# Patient Record
Sex: FEMALE | Race: WHITE
[De-identification: names, ages, dates, MRNs, and addresses within clinical notes are randomized per-mention and may not be internally consistent; named-entity substitution may affect disease eponyms.]

---

## 2018-01-09 ENCOUNTER — HOSPITAL ENCOUNTER (INPATIENT)
Dept: HOSPITAL 36 - ER | Age: 83
LOS: 6 days | Discharge: HOME | DRG: 885 | End: 2018-01-15
Attending: PSYCHIATRY & NEUROLOGY | Admitting: PSYCHIATRY & NEUROLOGY
Payer: MEDICARE

## 2018-01-09 DIAGNOSIS — Z90.11: ICD-10-CM

## 2018-01-09 DIAGNOSIS — D50.9: ICD-10-CM

## 2018-01-09 DIAGNOSIS — J44.9: ICD-10-CM

## 2018-01-09 DIAGNOSIS — M81.0: ICD-10-CM

## 2018-01-09 DIAGNOSIS — F31.9: Primary | ICD-10-CM

## 2018-01-09 DIAGNOSIS — M19.90: ICD-10-CM

## 2018-01-09 DIAGNOSIS — M54.9: ICD-10-CM

## 2018-01-09 DIAGNOSIS — I10: ICD-10-CM

## 2018-01-09 DIAGNOSIS — R45.851: ICD-10-CM

## 2018-01-09 DIAGNOSIS — Z79.51: ICD-10-CM

## 2018-01-09 DIAGNOSIS — F03.90: ICD-10-CM

## 2018-01-09 LAB
ALBUMIN SERPL-MCNC: 3.3 GM/DL (ref 3.7–5.3)
ALBUMIN/GLOB SERPL: 1.3 {RATIO} (ref 1–1.8)
ALP SERPL-CCNC: 70 U/L (ref 34–104)
ALT SERPL-CCNC: 12 U/L (ref 7–52)
ANION GAP SERPL CALC-SCNC: 5.1 MMOL/L (ref 7–16)
APPEARANCE UR: CLEAR
AST SERPL-CCNC: 12 U/L (ref 13–39)
BACTERIA #/AREA URNS HPF: (no result) /HPF
BASOPHILS # BLD AUTO: 0 TH/CUMM (ref 0–0.2)
BASOPHILS NFR BLD AUTO: 0.3 % (ref 0–2)
BILIRUB SERPL-MCNC: 0.2 MG/DL (ref 0.3–1)
BILIRUB UR-MCNC: NEGATIVE MG/DL
BUN SERPL-MCNC: 27 MG/DL (ref 7–25)
CALCIUM SERPL-MCNC: 8.4 MG/DL (ref 8.6–10.3)
CHLORIDE SERPL-SCNC: 109 MEQ/L (ref 98–107)
CO2 SERPL-SCNC: 30.8 MEQ/L (ref 21–31)
COLOR UR: YELLOW
CREAT SERPL-MCNC: 0.7 MG/DL (ref 0.6–1.2)
EOSINOPHIL # BLD AUTO: 0.1 TH/CMM (ref 0.1–0.4)
EOSINOPHIL NFR BLD AUTO: 0.9 % (ref 0–5)
EPI CELLS URNS QL MICRO: (no result) /LPF
ERYTHROCYTE [DISTWIDTH] IN BLOOD BY AUTOMATED COUNT: 16.4 % (ref 11.5–20)
GLOBULIN SER-MCNC: 2.5 GM/DL
GLUCOSE SERPL-MCNC: 92 MG/DL (ref 70–105)
GLUCOSE UR STRIP-MCNC: NEGATIVE MG/DL
HCT VFR BLD CALC: 35.2 % (ref 41–60)
HGB BLD-MCNC: 11.1 GM/DL (ref 12–16)
KETONES UR STRIP-MCNC: NEGATIVE MG/DL
LEUKOCYTE ESTERASE UR-ACNC: NEGATIVE
LYMPHOCYTE AB SER FC-ACNC: 1.3 TH/CMM (ref 1.5–3)
LYMPHOCYTES NFR BLD AUTO: 16.8 % (ref 20–50)
MCH RBC QN AUTO: 24.1 PG (ref 27–31)
MCHC RBC AUTO-ENTMCNC: 31.6 PG (ref 28–36)
MCV RBC AUTO: 76.3 FL (ref 81–100)
MICRO URNS: YES
MONOCYTES # BLD AUTO: 0.7 TH/CMM (ref 0.3–1)
MONOCYTES NFR BLD AUTO: 9.1 % (ref 2–10)
NEUTROPHILS # BLD: 5.7 TH/CMM (ref 1.8–8)
NEUTROPHILS NFR BLD AUTO: 72.9 % (ref 40–80)
NITRITE UR QL STRIP: NEGATIVE
PH UR STRIP: 5.5 [PH] (ref 4.6–8)
PLATELET # BLD: 397 TH/CMM (ref 150–400)
PMV BLD AUTO: 8.2 FL
POTASSIUM SERPL-SCNC: 3.9 MEQ/L (ref 3.5–5.1)
PROT UR STRIP-MCNC: NEGATIVE MG/DL
RBC # BLD AUTO: 4.61 MIL/CMM (ref 3.8–5.2)
RBC # UR STRIP: NEGATIVE /UL
RBC #/AREA URNS HPF: (no result) /HPF (ref 0–5)
SODIUM SERPL-SCNC: 141 MEQ/L (ref 136–145)
SP GR UR STRIP: >= 1.03 (ref 1–1.03)
URINALYSIS COMPLETE PNL UR: (no result)
UROBILINOGEN UR STRIP-ACNC: 0.2 E.U./DL (ref 0.2–1)
WBC # BLD AUTO: 7.8 TH/CMM (ref 4.8–10.8)
WBC #/AREA URNS HPF: (no result) /HPF (ref 0–5)

## 2018-01-09 PROCEDURE — Z7610: HCPCS

## 2018-01-09 NOTE — ED PHYSICIAN CHART
ED Chief Complaint/HPI





- Patient Information


Date Seen:: 18


Time Seen:: 19:45


Chief Complaint:: Suicidal ideation


History of Present Illness:: 


81 yo female was brought from SNF to ER for evaluation of suicidal ideation, 

talking about running into traffic.





ED Past Medical History





- Past Medical History


Past Medical History: HTN, Asthma/COPD, Other (back pain)


Surgical History: other (right partial mastectomy)


Psychiatricy History: Bipolar, Other (anxiety)





Family Medical History





- Family Member


  ** Mother


History Unknown: Yes


Ethnicity: Non-


Living Status: 





ED Assessment





- Assessment


Assessment/Comments:: 


Patient is cleared for geropsych admission.

## 2018-01-10 VITALS — DIASTOLIC BLOOD PRESSURE: 89 MMHG | SYSTOLIC BLOOD PRESSURE: 140 MMHG

## 2018-01-10 RX ADMIN — IPRATROPIUM BROMIDE AND ALBUTEROL SULFATE SCH: .5; 3 SOLUTION RESPIRATORY (INHALATION) at 23:50

## 2018-01-10 RX ADMIN — OYSTER SHELL CALCIUM WITH VITAMIN D SCH TAB: 500; 200 TABLET, FILM COATED ORAL at 08:24

## 2018-01-10 RX ADMIN — ALUMINUM HYDROXIDE, MAGNESIUM HYDROXIDE, AND SIMETHICONE PRN ML: 200; 200; 20 SUSPENSION ORAL at 10:57

## 2018-01-10 RX ADMIN — IPRATROPIUM BROMIDE AND ALBUTEROL SULFATE SCH ML: .5; 3 SOLUTION RESPIRATORY (INHALATION) at 16:00

## 2018-01-10 RX ADMIN — IPRATROPIUM BROMIDE AND ALBUTEROL SULFATE SCH ML: .5; 3 SOLUTION RESPIRATORY (INHALATION) at 08:14

## 2018-01-10 NOTE — HISTORY AND PHYSICAL
History of Present Illness





- HPI


Vital Signs: 





 Last Vital Signs











Temp  98.7 F   01/10/18 07:01


 


Pulse  92   01/10/18 08:14


 


Resp  18   01/10/18 08:14


 


BP  144/81   01/10/18 07:01


 


Pulse Ox  93   01/10/18 08:14














Family Medical History





- Family Member


  ** Mother


History Unknown: Yes


Ethnicity: Unknown


Living Status: 


Hx Family Cancer: Yes


Hx Family Coronary Artery Disease:  (UNKNOWN)


Hx Family Congestive Heart Failure:  (UNKNOWN)


Hx Family Hypertension:  (UNKNOWN)


Hx Family Stroke:  (UNKNOWN)


Hx Family Diabetes:  (UNKNOWN)


Hx Family Seizures:  (UNKNOWN)


Hx Family Dementia:  (UNKNOWN)


Hx Family AIDS:  (UNKNOWN)


Hx Family HIV: No


Hx Family COPD: Yes (UNKNOWN)


Hx Family Hepatitis:  (UNKNOWN)


Hx Family Psychiatric Problems:  (unknown)


Hx Family Tuberculosis:  (UNKNOWN)





- Medications


Home Medications: 


Home Medication











 Medication  Instructions  Recorded  Type


 


Acetaminophen [Tylenol] 650 mg PO Q6HR PRN 18 History


 


Albuterol/Ipratropium Neb [Duoneb 3 ml HHN Q8HR 18 History





Neb]   


 


Alendronate Sodium 10 mg PO DAILY 18 History


 


Alprazolam [Alprazolam*] 0.25 mg PO Q8HR PRN 18 History


 


Calcium Carb/Vit D 500mg/200U 1 tab PO DAILY 18 History





[Oscal w/Vitamin D]   


 


Donepezil Hcl [Aricept] 5 mg PO HS 18 History


 


Ferrous Sulfate [Iron] 325 mg PO BID 18 History


 


Fluticasone/Vilanterol [Breo 1 each IH DAILY 18 History





Ellipta 200-25 Mcg INH]   


 


Hydrocodone/APAP 5mg/325mg [Norco 1 tab PO BID PRN 18 History





5mg/325mg]   


 


QUEtiapine Fumarate [SEROquel] 50 mg PO HS 18 History


 


Ranitidine HCl 150 mg PO BID 18 History














- Allergies


Allergies/Adverse Reactions: 


 Allergies











Allergy/AdvReac Type Severity Reaction Status Date / Time


 


No Known Allergies Allergy   Verified 18 19:49

## 2018-01-10 NOTE — DIAGNOSTIC IMAGING REPORT
CHEST X-RAY: AP view



INDICATION: Shortness of breath



COMPARISON: None



FINDINGS: Chronic lung changes are seen. Postsurgical changes along the

right lower hemithorax is noted possibly the right breast. Linear

densities of the lower lung zones are noted. No evidence of gross free

air. Heart size normal. Tortuous aorta is noted with atherosclerotic

vascular disease. Degenerative changes of the spine are noted.



IMPRESSION:



Chronic lung changes of probable COPD. No focal consolidation

identified.



Linear bibasal densities which may represent atelectasis versus

scarring.



Postsurgical changes of right lower hemithorax which may be within the

right breast, please correlate with clinical and surgical history.



Tortuous aorta with atherosclerosis.

## 2018-01-11 RX ADMIN — OYSTER SHELL CALCIUM WITH VITAMIN D SCH TAB: 500; 200 TABLET, FILM COATED ORAL at 08:06

## 2018-01-11 RX ADMIN — ALUMINUM HYDROXIDE, MAGNESIUM HYDROXIDE, AND SIMETHICONE PRN ML: 200; 200; 20 SUSPENSION ORAL at 11:17

## 2018-01-11 RX ADMIN — IPRATROPIUM BROMIDE AND ALBUTEROL SULFATE SCH ML: .5; 3 SOLUTION RESPIRATORY (INHALATION) at 07:21

## 2018-01-11 RX ADMIN — ALUMINUM HYDROXIDE, MAGNESIUM HYDROXIDE, AND SIMETHICONE PRN ML: 200; 200; 20 SUSPENSION ORAL at 06:55

## 2018-01-11 RX ADMIN — IPRATROPIUM BROMIDE AND ALBUTEROL SULFATE SCH ML: .5; 3 SOLUTION RESPIRATORY (INHALATION) at 15:13

## 2018-01-11 RX ADMIN — IPRATROPIUM BROMIDE AND ALBUTEROL SULFATE SCH ML: .5; 3 SOLUTION RESPIRATORY (INHALATION) at 23:19

## 2018-01-11 NOTE — PROGRESS NOTES
DATE:  01/11/2018



Covering for Dr. Simmons.



Case was discussed with staff of the patient, reviewed records.  This is an

82-year-old female who was admitted on 01/09/2018, sent from skilled nursing

facility.  She was suicidal, endorsing intent and plan to run into traffic.  She

was feeling depressed.  No family support, no social supports and unable to

sleep.  Appetite is fair and feeling hopeless and helpless and not wanting to

live anymore, endorsing psychological distress in terms.  She has a history of

bipolar affective disorder, has been on Abilify, Seroquel and Paxil.  The

patient when I talked to her, she was internally preoccupied.  She is still

depressed, suicidal, but she did not disclose any plan for me.  She ignored me. 

She was restarted on her medication by Dr. Simmons yesterday.  She is on

alprazolam 0.25 mg every 8 hours and Aricept 5 mg at bedtime.  She is demented

and confused and Seroquel 50 mg at bedtime.  She is unpredictable, impulsive,

needing redirection, so it is too early to make any adjustments to her

medication.  We will continue to work with the patient in group therapy, milieu

therapy, adjust the medication as needed.





DD: 01/11/2018 12:34

DT: 01/11/2018 14:38

JOB# [id number]  [id number]

## 2018-01-11 NOTE — PSYCHOSOCIAL EVALUATION
DATE OF SERVICE:  01/10/2018



JUSTIFICATION FOR HOSPITALIZATION:  Sent over from a skilled nursing facility,

suicidal, apparently endorsing intent and plan to run into traffic.



CHIEF COMPLAINT:  "I am very depressed."



HISTORY OF PRESENT ILLNESS:  An 82-year-old female, severely depressed,

hopeless, despairing, no family support, no social support, difficulty sleeping

at night.  Fair appetite hopeless and helpless thoughts, not wanting to live

anymore, endorsing psychological distress, in turmoil.



PAST PSYCHIATRIC HISTORY:  Apparently with bipolar affective disorder, had been

on Abilify, Seroquel, and Paxil.



FAMILY HISTORY:  Noncontributory.



SOCIAL HISTORY:  Some family involvement from son and daughter.  The patient

states that she is lonely, misses her family, living in a skilled nursing

facility.



MEDICATIONS:  Noted.



PAST MEDICAL HISTORY:  Reviewed.



MENTAL STATUS EXAMINATION:  Stated age.  Fair eye contact, hard of hearing. 

Mood "Upset."  Affect constricted.  Thought processes were somewhat

disoriented, but awake and alert and engaged.  The patient endorsing SI with

thoughts to run into traffic.  No HI.  No overt psychotic symptoms.  Insight and

judgment diminished.  Poor coping, poor impulse control.



PROVISIONAL DIAGNOSES:  Bipolar, most recent episode depressed, also with

history of dementia.  Under medical, please see full H and P.



ESTIMATED LENGTH OF STAY:  5-6 days.



ASSESSMENT:  The patient requiring inpatient hospitalization, severely

depressed, hopeless, suicidal, endorsing intent and plan.



PLAN:  Restart Seroquel.  The patient historically seems to have been doing

better on Seroquel.  We will increase collateral.



TREATMENT PLAN:  Includes group as well as milieu therapy.



CONDITIONS FOR DISCHARGE:  Improved mood, improved affect, cessation of any SI,

better coping.





DD: 01/10/2018 09:39

DT: 01/11/2018 07:38

JOB# [id number]  [id number]

## 2018-01-11 NOTE — GENERAL PROGRESS NOTE
Subjective





- Review of Systems


Events since last encounter: 





confused


in no acute distress 





Objective





- Results


Result Diagrams: 


 01/09/18 19:59





 01/09/18 19:59


Recent Labs: 


 Laboratory Last Values











WBC  7.8 Th/cmm (4.8-10.8)   01/09/18  19:59    


 


RBC  4.61 Mil/cmm (3.80-5.20)   01/09/18  19:59    


 


Hgb  11.1 gm/dL (12-16)  L  01/09/18  19:59    


 


Hct  35.2 % (41.0-60)  L  01/09/18  19:59    


 


MCV  76.3 fl ()  L  01/09/18  19:59    


 


MCH  24.1 pg (27.0-31.0)  L  01/09/18  19:59    


 


MCHC Differential  31.6 pg (28.0-36.0)   01/09/18  19:59    


 


RDW  16.4 % (11.5-20.0)   01/09/18  19:59    


 


Plt Count  397 Th/cmm (150-400)   01/09/18  19:59    


 


MPV  8.2 fl  01/09/18  19:59    


 


Neutrophils %  72.9 % (40.0-80.0)   01/09/18  19:59    


 


Lymphocytes %  16.8 % (20.0-50.0)  L  01/09/18  19:59    


 


Monocytes %  9.1 % (2.0-10.0)   01/09/18  19:59    


 


Eosinophils %  0.9 % (0.0-5.0)   01/09/18  19:59    


 


Basophils %  0.3 % (0.0-2.0)   01/09/18  19:59    


 


Sodium  141 mEq/L (136-145)   01/09/18  19:59    


 


Potassium  3.9 mEq/L (3.5-5.1)   01/09/18  19:59    


 


Chloride  109 mEq/L ()  H  01/09/18  19:59    


 


Carbon Dioxide  30.8 mEq/L (21.0-31.0)   01/09/18  19:59    


 


Anion Gap  5.1  (7.0-16.0)  L  01/09/18  19:59    


 


BUN  27 mg/dL (7-25)  H  01/09/18  19:59    


 


Creatinine  0.7 mg/dL (0.6-1.2)   01/09/18  19:59    


 


Est GFR ( Amer)  TNP   01/09/18  19:59    


 


Est GFR (Non-Af Amer)  TNP   01/09/18  19:59    


 


BUN/Creatinine Ratio  38.6   01/09/18  19:59    


 


Glucose  92 mg/dL ()   01/09/18  19:59    


 


Calcium  8.4 mg/dL (8.6-10.3)  L  01/09/18  19:59    


 


Total Bilirubin  0.2 mg/dL (0.3-1.0)  L  01/09/18  19:59    


 


AST  12 U/L (13-39)  L  01/09/18  19:59    


 


ALT  12 U/L (7-52)   01/09/18  19:59    


 


Alkaline Phosphatase  70 U/L ()   01/09/18  19:59    


 


Total Protein  5.8 gm/dL (6.0-8.3)  L  01/09/18  19:59    


 


Albumin  3.3 gm/dL (3.7-5.3)  L  01/09/18  19:59    


 


Globulin  2.5 gm/dL  01/09/18  19:59    


 


Albumin/Globulin Ratio  1.3  (1.0-1.8)   01/09/18  19:59    


 


TSH  1.17 uIU/ml (0.34-5.60)   01/09/18  19:59    


 


Urine Source  RANDOM   01/09/18  21:30    


 


Urine Color  YELLOW   01/09/18  21:30    


 


Urine Clarity  CLEAR  (CLEAR)   01/09/18  21:30    


 


Urine pH  5.5  (4.6 - 8.0)   01/09/18  21:30    


 


Ur Specific Gravity  >= 1.030  (1.005-1.030)   01/09/18  21:30    


 


Urine Protein  NEGATIVE mg/dL (NEGATIVE)   01/09/18  21:30    


 


Urine Glucose (UA)  NEGATIVE mg/dL (NEGATIVE)   01/09/18  21:30    


 


Urine Ketones  NEGATIVE mg/dL (NEGATIVE)   01/09/18  21:30    


 


Urine Blood  NEGATIVE  (NEGATIVE)   01/09/18  21:30    


 


Urine Nitrate  NEGATIVE  (NEGATIVE)   01/09/18  21:30    


 


Urine Bilirubin  NEGATIVE  (NEGATIVE)   01/09/18  21:30    


 


Urine Urobilinogen  0.2 E.U./dL (0.2 - 1.0)   01/09/18  21:30    


 


Ur Leukocyte Esterase  NEGATIVE  (NEGATIVE)   01/09/18  21:30    


 


Urine RBC  0-2 /hpf (0-5)   01/09/18  21:30    


 


Urine WBC  2-5 /hpf (0-5)   01/09/18  21:30    


 


Ur Epithelial Cells  OCCASIONAL /lpf (FEW)   01/09/18  21:30    


 


Urine Bacteria  OCCASIONAL /hpf (NONE SEEN)   01/09/18  21:30    














- Physical Exam


Vitals and I&O: 


 Vital Signs











Temp  98.6 F   01/10/18 17:48


 


Pulse  90   01/11/18 07:22


 


Resp  18   01/11/18 07:22


 


BP  132/73   01/10/18 17:48


 


Pulse Ox  95   01/11/18 07:22








 Intake & Output











 01/10/18 01/11/18 01/11/18





 18:59 06:59 18:59


 


Intake Total 120  


 


Balance 120  


 


Intake:   


 


  Oral 120  


 


Other:   


 


  # Voids 3  











Active Medications: 


Current Medications





Acetaminophen (Tylenol)  650 mg PO Q6HR PRN


   PRN Reason: Mild Pain or Fever >101


   Stop: 03/11/18 00:07


Acetaminophen/Hydrocodone Bitart (Norco 5mg/325mg)  1 tab PO BID PRN


   PRN Reason: Pain (Moderate)


   Stop: 03/11/18 00:07


Al Hydrox/Mg Hydrox/Simethicone (Maalox)  30 ml PO Q4HR PRN


   PRN Reason: GI DISTRESS


   Stop: 03/11/18 00:00


   Last Admin: 01/11/18 06:55 Dose:  30 ml


Albuterol/Ipratropium (Duoneb Neb)  3 ml HHN Q8HRT [facility]


   Stop: 03/11/18 06:59


   Last Admin: 01/11/18 07:21 Dose:  3 ml


Alprazolam (Xanax)  0.25 mg PO Q8HR PRN; Protocol


   PRN Reason: Anxiety


   Stop: 03/11/18 00:07


Calcium/Vitamin D (Oscal W/Vitamin D)  1 tab PO DAILY [facility]


   Stop: 03/11/18 08:59


   Last Admin: 01/11/18 08:06 Dose:  1 tab


Donepezil HCl (Aricept)  5 mg PO HS [facility]


   Stop: 03/11/18 20:59


   Last Admin: 01/10/18 20:24 Dose:  5 mg


Ferrous Sulfate (Iron)  325 mg PO BID [facility]


   Stop: 03/11/18 08:59


   Last Admin: 01/11/18 08:06 Dose:  325 mg


Lorazepam (Ativan)  0.5 mg PO Q4HR PRN; Protocol


   PRN Reason: Anxiety


   Stop: 02/09/18 00:00


   Last Admin: 01/10/18 12:51 Dose:  0.5 mg


Magnesium Hydroxide (Milk Of Magnesia)  30 ml PO HS PRN


   PRN Reason: Constipation


Miscellaneous (Alendronate Sodium [Alendronate Sodium])  10 mg PO DAILY [facility]


   Stop: 03/11/18 08:59


Miscellaneous (Fluticasone/Vilanterol [Breo Ellipta 200-25 Mcg Inh])  1 each IH 

DAILY [facility]


   Stop: 03/11/18 08:59


Multivitamins/Vitamin C (Theragran)  1 tab PO DAILY [facility]


   Stop: 03/11/18 08:59


   Last Admin: 01/11/18 08:06 Dose:  1 tab


Quetiapine Fumarate (Seroquel)  50 mg PO HS [facility]


   PRN Reason: Protocol


   Stop: 03/11/18 20:59


   Last Admin: 01/10/18 20:24 Dose:  50 mg


Zolpidem Tartrate (Ambien)  5 mg PO HS PRN


   PRN Reason: Insomnia


   Stop: 03/11/18 00:00

## 2018-01-12 RX ADMIN — IPRATROPIUM BROMIDE AND ALBUTEROL SULFATE SCH ML: .5; 3 SOLUTION RESPIRATORY (INHALATION) at 08:37

## 2018-01-12 RX ADMIN — IPRATROPIUM BROMIDE AND ALBUTEROL SULFATE SCH ML: .5; 3 SOLUTION RESPIRATORY (INHALATION) at 16:17

## 2018-01-12 RX ADMIN — ALUMINUM HYDROXIDE, MAGNESIUM HYDROXIDE, AND SIMETHICONE PRN ML: 200; 200; 20 SUSPENSION ORAL at 02:03

## 2018-01-12 RX ADMIN — ALUMINUM HYDROXIDE, MAGNESIUM HYDROXIDE, AND SIMETHICONE PRN ML: 200; 200; 20 SUSPENSION ORAL at 17:49

## 2018-01-12 RX ADMIN — OYSTER SHELL CALCIUM WITH VITAMIN D SCH TAB: 500; 200 TABLET, FILM COATED ORAL at 08:54

## 2018-01-12 NOTE — INTERNAL MEDICINE PROG NOTE
Internal Medicine Subjective





- Subjective


Service Date: 01/12/18


Patient seen and examined:: with staff


Patient is:: awake


Per staff patient has:: no adverse event





Internal Medicine Objective





- Results


Result Diagrams: 


 01/09/18 19:59





 01/09/18 19:59


Recent Labs: 


 Laboratory Last Values











WBC  7.8 Th/cmm (4.8-10.8)   01/09/18  19:59    


 


RBC  4.61 Mil/cmm (3.80-5.20)   01/09/18  19:59    


 


Hgb  11.1 gm/dL (12-16)  L  01/09/18  19:59    


 


Hct  35.2 % (41.0-60)  L  01/09/18  19:59    


 


MCV  76.3 fl ()  L  01/09/18  19:59    


 


MCH  24.1 pg (27.0-31.0)  L  01/09/18  19:59    


 


MCHC Differential  31.6 pg (28.0-36.0)   01/09/18  19:59    


 


RDW  16.4 % (11.5-20.0)   01/09/18  19:59    


 


Plt Count  397 Th/cmm (150-400)   01/09/18  19:59    


 


MPV  8.2 fl  01/09/18  19:59    


 


Neutrophils %  72.9 % (40.0-80.0)   01/09/18  19:59    


 


Lymphocytes %  16.8 % (20.0-50.0)  L  01/09/18  19:59    


 


Monocytes %  9.1 % (2.0-10.0)   01/09/18  19:59    


 


Eosinophils %  0.9 % (0.0-5.0)   01/09/18  19:59    


 


Basophils %  0.3 % (0.0-2.0)   01/09/18  19:59    


 


Sodium  141 mEq/L (136-145)   01/09/18  19:59    


 


Potassium  3.9 mEq/L (3.5-5.1)   01/09/18  19:59    


 


Chloride  109 mEq/L ()  H  01/09/18  19:59    


 


Carbon Dioxide  30.8 mEq/L (21.0-31.0)   01/09/18  19:59    


 


Anion Gap  5.1  (7.0-16.0)  L  01/09/18  19:59    


 


BUN  27 mg/dL (7-25)  H  01/09/18  19:59    


 


Creatinine  0.7 mg/dL (0.6-1.2)   01/09/18  19:59    


 


Est GFR ( Amer)  TNP   01/09/18  19:59    


 


Est GFR (Non-Af Amer)  TNP   01/09/18  19:59    


 


BUN/Creatinine Ratio  38.6   01/09/18  19:59    


 


Glucose  92 mg/dL ()   01/09/18  19:59    


 


Calcium  8.4 mg/dL (8.6-10.3)  L  01/09/18  19:59    


 


Total Bilirubin  0.2 mg/dL (0.3-1.0)  L  01/09/18  19:59    


 


AST  12 U/L (13-39)  L  01/09/18  19:59    


 


ALT  12 U/L (7-52)   01/09/18  19:59    


 


Alkaline Phosphatase  70 U/L ()   01/09/18  19:59    


 


Total Protein  5.8 gm/dL (6.0-8.3)  L  01/09/18  19:59    


 


Albumin  3.3 gm/dL (3.7-5.3)  L  01/09/18  19:59    


 


Globulin  2.5 gm/dL  01/09/18  19:59    


 


Albumin/Globulin Ratio  1.3  (1.0-1.8)   01/09/18  19:59    


 


TSH  1.17 uIU/ml (0.34-5.60)   01/09/18  19:59    


 


Urine Source  RANDOM   01/09/18  21:30    


 


Urine Color  YELLOW   01/09/18  21:30    


 


Urine Clarity  CLEAR  (CLEAR)   01/09/18  21:30    


 


Urine pH  5.5  (4.6 - 8.0)   01/09/18  21:30    


 


Ur Specific Gravity  >= 1.030  (1.005-1.030)   01/09/18  21:30    


 


Urine Protein  NEGATIVE mg/dL (NEGATIVE)   01/09/18  21:30    


 


Urine Glucose (UA)  NEGATIVE mg/dL (NEGATIVE)   01/09/18  21:30    


 


Urine Ketones  NEGATIVE mg/dL (NEGATIVE)   01/09/18  21:30    


 


Urine Blood  NEGATIVE  (NEGATIVE)   01/09/18  21:30    


 


Urine Nitrate  NEGATIVE  (NEGATIVE)   01/09/18  21:30    


 


Urine Bilirubin  NEGATIVE  (NEGATIVE)   01/09/18  21:30    


 


Urine Urobilinogen  0.2 E.U./dL (0.2 - 1.0)   01/09/18  21:30    


 


Ur Leukocyte Esterase  NEGATIVE  (NEGATIVE)   01/09/18  21:30    


 


Urine RBC  0-2 /hpf (0-5)   01/09/18  21:30    


 


Urine WBC  2-5 /hpf (0-5)   01/09/18  21:30    


 


Ur Epithelial Cells  OCCASIONAL /lpf (FEW)   01/09/18  21:30    


 


Urine Bacteria  OCCASIONAL /hpf (NONE SEEN)   01/09/18  21:30    














- Physical Exam


Vitals and I&O: 


 Vital Signs











Temp  98.5 F   01/12/18 16:40


 


Pulse  88   01/12/18 16:40


 


Resp  22   01/12/18 16:40


 


BP  137/76   01/12/18 16:40


 


Pulse Ox  94   01/12/18 16:40








 Intake & Output











 01/12/18 01/12/18 01/13/18





 06:59 18:59 06:59


 


Intake Total 250  


 


Balance 250  


 


Intake:   


 


  Oral 250  


 


Other:   


 


  # Voids 2  


 


  # Bowel Movements 0  











Active Medications: 


Current Medications





Acetaminophen (Tylenol)  650 mg PO Q6HR PRN


   PRN Reason: Mild Pain or Fever >101


   Stop: 03/11/18 00:07


Acetaminophen/Hydrocodone Bitart (Norco 5mg/325mg)  1 tab PO BID PRN


   PRN Reason: Pain (Moderate)


   Stop: 03/11/18 00:07


Al Hydrox/Mg Hydrox/Simethicone (Maalox)  30 ml PO Q4HR PRN


   PRN Reason: GI DISTRESS


   Stop: 03/11/18 00:00


   Last Admin: 01/12/18 17:49 Dose:  30 ml


Albuterol/Ipratropium (Duoneb Neb)  3 ml HHN Q8HRT [facility]


   Stop: 03/11/18 06:59


   Last Admin: 01/12/18 16:17 Dose:  3 ml


Alendronate Sodium (Fosamax)  70 mg PO Sa@0600 [facility]


   Stop: 03/14/18 05:59


Alprazolam (Xanax)  0.25 mg PO Q8HR PRN; Protocol


   PRN Reason: Anxiety


   Stop: 03/11/18 00:07


Calcium/Vitamin D (Oscal W/Vitamin D)  1 tab PO DAILY [facility]


   Stop: 03/11/18 08:59


   Last Admin: 01/12/18 08:54 Dose:  1 tab


Donepezil HCl (Aricept)  5 mg PO HS [facility]


   Stop: 03/11/18 20:59


   Last Admin: 01/11/18 21:08 Dose:  5 mg


Ferrous Sulfate (Iron)  325 mg PO BID [facility]


   Stop: 03/11/18 08:59


   Last Admin: 01/12/18 17:49 Dose:  325 mg


Lorazepam (Ativan)  0.5 mg PO Q4HR PRN; Protocol


   PRN Reason: Anxiety


   Stop: 02/09/18 00:00


   Last Admin: 01/12/18 01:57 Dose:  0.5 mg


Magnesium Hydroxide (Milk Of Magnesia)  30 ml PO HS PRN


   PRN Reason: Constipation


Multivitamins/Vitamin C (Theragran)  1 tab PO DAILY [facility]


   Stop: 03/11/18 08:59


   Last Admin: 01/12/18 08:54 Dose:  1 tab


Quetiapine Fumarate (Seroquel)  50 mg PO HS [facility]


   PRN Reason: Protocol


   Stop: 03/11/18 20:59


   Last Admin: 01/11/18 21:08 Dose:  50 mg


Zolpidem Tartrate (Ambien)  5 mg PO HS PRN


   PRN Reason: Insomnia


   Stop: 03/11/18 00:00








General: alert


HEENT: NC/AT, PERRLA


Neck: Supple


Lungs: CTAB


Abdomen: soft





Internal Medicine Assmt/Plan





- Assessment


Assessment: 





HTN, Asthma/COPD


back pain





- Plan


Plan: 





cpm

## 2018-01-12 NOTE — PROGRESS NOTES
DATE:  01/12/2018



Case was discussed with staff of the patient, reviewed records.  The patient

continues to be depressed, at times suicidal, continues to be unpredictable,

impulsive, needing redirection.  Continues to feel hopeless and helpless. 

Continues to have poor insight, unable to make safe plan for self-care.  She is

also demented.  No side effects with the medication, no sedation, no nausea, no

extrapyramidal symptoms.  We will continue outpatient group therapy, milieu

therapy, adjust the medication as needed.





DD: 01/12/2018 11:57

DT: 01/12/2018 19:35

[facility]# [id number]  [id number]

## 2018-01-13 RX ADMIN — HYDROCODONE BITARTRATE AND ACETAMINOPHEN PRN TAB: 5; 325 TABLET ORAL at 05:30

## 2018-01-13 RX ADMIN — IPRATROPIUM BROMIDE AND ALBUTEROL SULFATE SCH ML: .5; 3 SOLUTION RESPIRATORY (INHALATION) at 00:19

## 2018-01-13 RX ADMIN — IPRATROPIUM BROMIDE AND ALBUTEROL SULFATE SCH ML: .5; 3 SOLUTION RESPIRATORY (INHALATION) at 07:56

## 2018-01-13 RX ADMIN — HYDROCODONE BITARTRATE AND ACETAMINOPHEN PRN TAB: 5; 325 TABLET ORAL at 12:50

## 2018-01-13 RX ADMIN — IPRATROPIUM BROMIDE AND ALBUTEROL SULFATE SCH ML: .5; 3 SOLUTION RESPIRATORY (INHALATION) at 23:38

## 2018-01-13 RX ADMIN — IPRATROPIUM BROMIDE AND ALBUTEROL SULFATE SCH ML: .5; 3 SOLUTION RESPIRATORY (INHALATION) at 14:50

## 2018-01-13 RX ADMIN — OYSTER SHELL CALCIUM WITH VITAMIN D SCH TAB: 500; 200 TABLET, FILM COATED ORAL at 08:56

## 2018-01-13 RX ADMIN — ALUMINUM HYDROXIDE, MAGNESIUM HYDROXIDE, AND SIMETHICONE PRN ML: 200; 200; 20 SUSPENSION ORAL at 18:11

## 2018-01-14 RX ADMIN — IPRATROPIUM BROMIDE AND ALBUTEROL SULFATE SCH ML: .5; 3 SOLUTION RESPIRATORY (INHALATION) at 14:20

## 2018-01-14 RX ADMIN — IPRATROPIUM BROMIDE AND ALBUTEROL SULFATE SCH ML: .5; 3 SOLUTION RESPIRATORY (INHALATION) at 22:42

## 2018-01-14 RX ADMIN — ALUMINUM HYDROXIDE, MAGNESIUM HYDROXIDE, AND SIMETHICONE PRN ML: 200; 200; 20 SUSPENSION ORAL at 20:29

## 2018-01-14 RX ADMIN — ALUMINUM HYDROXIDE, MAGNESIUM HYDROXIDE, AND SIMETHICONE PRN ML: 200; 200; 20 SUSPENSION ORAL at 11:44

## 2018-01-14 RX ADMIN — IPRATROPIUM BROMIDE AND ALBUTEROL SULFATE SCH ML: .5; 3 SOLUTION RESPIRATORY (INHALATION) at 06:51

## 2018-01-14 RX ADMIN — HYDROCODONE BITARTRATE AND ACETAMINOPHEN PRN TAB: 5; 325 TABLET ORAL at 06:45

## 2018-01-14 RX ADMIN — OYSTER SHELL CALCIUM WITH VITAMIN D SCH TAB: 500; 200 TABLET, FILM COATED ORAL at 10:21

## 2018-01-14 NOTE — GENERAL PROGRESS NOTE
Subjective





- Review of Systems


Events since last encounter: 





awake


comfortable


in no acute distress





Objective





- Results


Result Diagrams: 


 01/09/18 19:59





 01/09/18 19:59


Recent Labs: 


 Laboratory Last Values











WBC  7.8 Th/cmm (4.8-10.8)   01/09/18  19:59    


 


RBC  4.61 Mil/cmm (3.80-5.20)   01/09/18  19:59    


 


Hgb  11.1 gm/dL (12-16)  L  01/09/18  19:59    


 


Hct  35.2 % (41.0-60)  L  01/09/18  19:59    


 


MCV  76.3 fl ()  L  01/09/18  19:59    


 


MCH  24.1 pg (27.0-31.0)  L  01/09/18  19:59    


 


MCHC Differential  31.6 pg (28.0-36.0)   01/09/18  19:59    


 


RDW  16.4 % (11.5-20.0)   01/09/18  19:59    


 


Plt Count  397 Th/cmm (150-400)   01/09/18  19:59    


 


MPV  8.2 fl  01/09/18  19:59    


 


Neutrophils %  72.9 % (40.0-80.0)   01/09/18  19:59    


 


Lymphocytes %  16.8 % (20.0-50.0)  L  01/09/18  19:59    


 


Monocytes %  9.1 % (2.0-10.0)   01/09/18  19:59    


 


Eosinophils %  0.9 % (0.0-5.0)   01/09/18  19:59    


 


Basophils %  0.3 % (0.0-2.0)   01/09/18  19:59    


 


Sodium  141 mEq/L (136-145)   01/09/18  19:59    


 


Potassium  3.9 mEq/L (3.5-5.1)   01/09/18  19:59    


 


Chloride  109 mEq/L ()  H  01/09/18  19:59    


 


Carbon Dioxide  30.8 mEq/L (21.0-31.0)   01/09/18  19:59    


 


Anion Gap  5.1  (7.0-16.0)  L  01/09/18  19:59    


 


BUN  27 mg/dL (7-25)  H  01/09/18  19:59    


 


Creatinine  0.7 mg/dL (0.6-1.2)   01/09/18  19:59    


 


Est GFR ( Amer)  TNP   01/09/18  19:59    


 


Est GFR (Non-Af Amer)  TNP   01/09/18  19:59    


 


BUN/Creatinine Ratio  38.6   01/09/18  19:59    


 


Glucose  92 mg/dL ()   01/09/18  19:59    


 


Calcium  8.4 mg/dL (8.6-10.3)  L  01/09/18  19:59    


 


Total Bilirubin  0.2 mg/dL (0.3-1.0)  L  01/09/18  19:59    


 


AST  12 U/L (13-39)  L  01/09/18  19:59    


 


ALT  12 U/L (7-52)   01/09/18  19:59    


 


Alkaline Phosphatase  70 U/L ()   01/09/18  19:59    


 


Total Protein  5.8 gm/dL (6.0-8.3)  L  01/09/18  19:59    


 


Albumin  3.3 gm/dL (3.7-5.3)  L  01/09/18  19:59    


 


Globulin  2.5 gm/dL  01/09/18  19:59    


 


Albumin/Globulin Ratio  1.3  (1.0-1.8)   01/09/18  19:59    


 


TSH  1.17 uIU/ml (0.34-5.60)   01/09/18  19:59    


 


Urine Source  RANDOM   01/09/18  21:30    


 


Urine Color  YELLOW   01/09/18  21:30    


 


Urine Clarity  CLEAR  (CLEAR)   01/09/18  21:30    


 


Urine pH  5.5  (4.6 - 8.0)   01/09/18  21:30    


 


Ur Specific Gravity  >= 1.030  (1.005-1.030)   01/09/18  21:30    


 


Urine Protein  NEGATIVE mg/dL (NEGATIVE)   01/09/18  21:30    


 


Urine Glucose (UA)  NEGATIVE mg/dL (NEGATIVE)   01/09/18  21:30    


 


Urine Ketones  NEGATIVE mg/dL (NEGATIVE)   01/09/18  21:30    


 


Urine Blood  NEGATIVE  (NEGATIVE)   01/09/18  21:30    


 


Urine Nitrate  NEGATIVE  (NEGATIVE)   01/09/18  21:30    


 


Urine Bilirubin  NEGATIVE  (NEGATIVE)   01/09/18  21:30    


 


Urine Urobilinogen  0.2 E.U./dL (0.2 - 1.0)   01/09/18  21:30    


 


Ur Leukocyte Esterase  NEGATIVE  (NEGATIVE)   01/09/18  21:30    


 


Urine RBC  0-2 /hpf (0-5)   01/09/18  21:30    


 


Urine WBC  2-5 /hpf (0-5)   01/09/18  21:30    


 


Ur Epithelial Cells  OCCASIONAL /lpf (FEW)   01/09/18  21:30    


 


Urine Bacteria  OCCASIONAL /hpf (NONE SEEN)   01/09/18  21:30    














- Physical Exam


Vitals and I&O: 


 Vital Signs











Temp  97.6 F   01/13/18 14:53


 


Pulse  102   01/14/18 06:50


 


Resp  16   01/14/18 06:50


 


BP  123/66   01/13/18 14:53


 


Pulse Ox  95   01/14/18 06:50








 Intake & Output











 01/13/18 01/14/18 01/14/18





 18:59 06:59 18:59


 


Intake Total 500  


 


Output Total 1  


 


Balance 499  


 


Intake:   


 


  Oral 500  


 


Output:   


 


  Urine/Stool Mix 1  


 


Other:   


 


  # Voids 3  











Active Medications: 


Current Medications





Acetaminophen (Tylenol)  650 mg PO Q6HR PRN


   PRN Reason: Mild Pain or Fever >101


   Stop: 03/11/18 00:07


Acetaminophen/Hydrocodone Bitart (Norco 5mg/325mg)  1 tab PO BID PRN


   PRN Reason: Pain (Moderate)


   Stop: 03/11/18 00:07


   Last Admin: 01/14/18 06:45 Dose:  1 tab


Al Hydrox/Mg Hydrox/Simethicone (Maalox)  30 ml PO Q4HR PRN


   PRN Reason: GI DISTRESS


   Stop: 03/11/18 00:00


   Last Admin: 01/13/18 18:11 Dose:  30 ml


Albuterol/Ipratropium (Duoneb Neb)  3 ml HHN Q8HRT LASHANDA


   Stop: 03/11/18 06:59


   Last Admin: 01/14/18 06:51 Dose:  3 ml


Alendronate Sodium (Fosamax)  70 mg PO Sa@0600 LASHANDA


   Stop: 03/14/18 05:59


   Last Admin: 01/13/18 05:29 Dose:  70 mg


Alprazolam (Xanax)  0.25 mg PO Q8HR PRN; Protocol


   PRN Reason: Anxiety


   Stop: 03/11/18 00:07


Calcium/Vitamin D (Oscal W/Vitamin D)  1 tab PO DAILY LASHANDA


   Stop: 03/11/18 08:59


   Last Admin: 01/14/18 10:21 Dose:  1 tab


Donepezil HCl (Aricept)  5 mg PO HS LASHANDA


   Stop: 03/11/18 20:59


   Last Admin: 01/13/18 21:27 Dose:  5 mg


Ferrous Sulfate (Iron)  325 mg PO BID LASHANDA


   Stop: 03/11/18 08:59


   Last Admin: 01/14/18 10:21 Dose:  325 mg


Lorazepam (Ativan)  0.5 mg PO Q4HR PRN; Protocol


   PRN Reason: Anxiety


   Stop: 02/09/18 00:00


   Last Admin: 01/12/18 01:57 Dose:  0.5 mg


Magnesium Hydroxide (Milk Of Magnesia)  30 ml PO HS PRN


   PRN Reason: Constipation


Multivitamins/Vitamin C (Theragran)  1 tab PO DAILY LASHANDA


   Stop: 03/11/18 08:59


   Last Admin: 01/14/18 10:21 Dose:  1 tab


Quetiapine Fumarate (Seroquel)  50 mg PO HS LASHANDA


   PRN Reason: Protocol


   Stop: 03/11/18 20:59


   Last Admin: 01/13/18 21:27 Dose:  50 mg


Zolpidem Tartrate (Ambien)  5 mg PO HS PRN


   PRN Reason: Insomnia


   Stop: 03/11/18 00:00


   Last Admin: 01/13/18 21:27 Dose:  5 mg











Assessment/Plan





- Problem List


Patient Problems: 


All Active Problems





Asthma (Acute) J45.909


Back pain (Acute) 


COPD (chronic obstructive pulmonary disease) (Acute) 


HTN (hypertension) (Acute) I10











- Plan


Plan: 


As per psych

## 2018-01-15 RX ADMIN — IPRATROPIUM BROMIDE AND ALBUTEROL SULFATE SCH ML: .5; 3 SOLUTION RESPIRATORY (INHALATION) at 10:47

## 2018-01-15 RX ADMIN — OYSTER SHELL CALCIUM WITH VITAMIN D SCH TAB: 500; 200 TABLET, FILM COATED ORAL at 09:19

## 2018-01-15 RX ADMIN — IPRATROPIUM BROMIDE AND ALBUTEROL SULFATE SCH ML: .5; 3 SOLUTION RESPIRATORY (INHALATION) at 14:22

## 2018-01-15 RX ADMIN — IPRATROPIUM BROMIDE AND ALBUTEROL SULFATE SCH ML: .5; 3 SOLUTION RESPIRATORY (INHALATION) at 06:26

## 2018-01-15 NOTE — GENERAL PROGRESS NOTE
Subjective





- Review of Systems


Events since last encounter: 





patient continues to be depressed 


hopeless , denies pain 





Objective





- Results


Result Diagrams: 


 01/09/18 19:59





 01/09/18 19:59


Recent Labs: 


 Laboratory Last Values











WBC  7.8 Th/cmm (4.8-10.8)   01/09/18  19:59    


 


RBC  4.61 Mil/cmm (3.80-5.20)   01/09/18  19:59    


 


Hgb  11.1 gm/dL (12-16)  L  01/09/18  19:59    


 


Hct  35.2 % (41.0-60)  L  01/09/18  19:59    


 


MCV  76.3 fl ()  L  01/09/18  19:59    


 


MCH  24.1 pg (27.0-31.0)  L  01/09/18  19:59    


 


MCHC Differential  31.6 pg (28.0-36.0)   01/09/18  19:59    


 


RDW  16.4 % (11.5-20.0)   01/09/18  19:59    


 


Plt Count  397 Th/cmm (150-400)   01/09/18  19:59    


 


MPV  8.2 fl  01/09/18  19:59    


 


Neutrophils %  72.9 % (40.0-80.0)   01/09/18  19:59    


 


Lymphocytes %  16.8 % (20.0-50.0)  L  01/09/18  19:59    


 


Monocytes %  9.1 % (2.0-10.0)   01/09/18  19:59    


 


Eosinophils %  0.9 % (0.0-5.0)   01/09/18  19:59    


 


Basophils %  0.3 % (0.0-2.0)   01/09/18  19:59    


 


Sodium  141 mEq/L (136-145)   01/09/18  19:59    


 


Potassium  3.9 mEq/L (3.5-5.1)   01/09/18  19:59    


 


Chloride  109 mEq/L ()  H  01/09/18  19:59    


 


Carbon Dioxide  30.8 mEq/L (21.0-31.0)   01/09/18  19:59    


 


Anion Gap  5.1  (7.0-16.0)  L  01/09/18  19:59    


 


BUN  27 mg/dL (7-25)  H  01/09/18  19:59    


 


Creatinine  0.7 mg/dL (0.6-1.2)   01/09/18  19:59    


 


Est GFR ( Amer)  TNP   01/09/18  19:59    


 


Est GFR (Non-Af Amer)  TNP   01/09/18  19:59    


 


BUN/Creatinine Ratio  38.6   01/09/18  19:59    


 


Glucose  92 mg/dL ()   01/09/18  19:59    


 


Calcium  8.4 mg/dL (8.6-10.3)  L  01/09/18  19:59    


 


Total Bilirubin  0.2 mg/dL (0.3-1.0)  L  01/09/18  19:59    


 


AST  12 U/L (13-39)  L  01/09/18  19:59    


 


ALT  12 U/L (7-52)   01/09/18  19:59    


 


Alkaline Phosphatase  70 U/L ()   01/09/18  19:59    


 


Total Protein  5.8 gm/dL (6.0-8.3)  L  01/09/18  19:59    


 


Albumin  3.3 gm/dL (3.7-5.3)  L  01/09/18  19:59    


 


Globulin  2.5 gm/dL  01/09/18  19:59    


 


Albumin/Globulin Ratio  1.3  (1.0-1.8)   01/09/18  19:59    


 


TSH  1.17 uIU/ml (0.34-5.60)   01/09/18  19:59    


 


Urine Source  RANDOM   01/09/18  21:30    


 


Urine Color  YELLOW   01/09/18  21:30    


 


Urine Clarity  CLEAR  (CLEAR)   01/09/18  21:30    


 


Urine pH  5.5  (4.6 - 8.0)   01/09/18  21:30    


 


Ur Specific Gravity  >= 1.030  (1.005-1.030)   01/09/18  21:30    


 


Urine Protein  NEGATIVE mg/dL (NEGATIVE)   01/09/18  21:30    


 


Urine Glucose (UA)  NEGATIVE mg/dL (NEGATIVE)   01/09/18  21:30    


 


Urine Ketones  NEGATIVE mg/dL (NEGATIVE)   01/09/18  21:30    


 


Urine Blood  NEGATIVE  (NEGATIVE)   01/09/18  21:30    


 


Urine Nitrate  NEGATIVE  (NEGATIVE)   01/09/18  21:30    


 


Urine Bilirubin  NEGATIVE  (NEGATIVE)   01/09/18  21:30    


 


Urine Urobilinogen  0.2 E.U./dL (0.2 - 1.0)   01/09/18  21:30    


 


Ur Leukocyte Esterase  NEGATIVE  (NEGATIVE)   01/09/18  21:30    


 


Urine RBC  0-2 /hpf (0-5)   01/09/18  21:30    


 


Urine WBC  2-5 /hpf (0-5)   01/09/18  21:30    


 


Ur Epithelial Cells  OCCASIONAL /lpf (FEW)   01/09/18  21:30    


 


Urine Bacteria  OCCASIONAL /hpf (NONE SEEN)   01/09/18  21:30    














- Physical Exam


Vitals and I&O: 


 Vital Signs











Temp  98.3 F   01/15/18 05:35


 


Pulse  89   01/15/18 10:56


 


Resp  18   01/15/18 10:56


 


BP  102/56   01/15/18 05:35


 


Pulse Ox  94   01/15/18 10:56








 Intake & Output











 01/14/18 01/15/18 01/15/18





 18:59 06:59 18:59


 


Intake Total 1000 240 


 


Balance 1000 240 


 


Intake:   


 


  Oral 1000 240 


 


Other:   


 


  # Voids 2 3 


 


  # Bowel Movements 1 0 











Active Medications: 


Current Medications





Acetaminophen (Tylenol)  650 mg PO Q6HR PRN


   PRN Reason: Mild Pain or Fever >101


   Stop: 03/11/18 00:07


Acetaminophen/Hydrocodone Bitart (Norco 5mg/325mg)  1 tab PO BID PRN


   PRN Reason: Pain (Moderate)


   Stop: 03/11/18 00:07


   Last Admin: 01/14/18 06:45 Dose:  1 tab


Al Hydrox/Mg Hydrox/Simethicone (Maalox)  30 ml PO Q4HR PRN


   PRN Reason: GI DISTRESS


   Stop: 03/11/18 00:00


   Last Admin: 01/14/18 20:29 Dose:  30 ml


Albuterol/Ipratropium (Duoneb Neb)  3 ml HHN Q4HRT LASHANDA


   Stop: 03/16/18 10:59


   Last Admin: 01/15/18 10:47 Dose:  3 ml


Alendronate Sodium (Fosamax)  70 mg PO Sa@0600 LASHANDA


   Stop: 03/14/18 05:59


   Last Admin: 01/13/18 05:29 Dose:  70 mg


Alprazolam (Xanax)  0.25 mg PO Q8HR PRN; Protocol


   PRN Reason: Anxiety


   Stop: 03/11/18 00:07


Calcium/Vitamin D (Oscal W/Vitamin D)  1 tab PO DAILY LASHANDA


   Stop: 03/11/18 08:59


   Last Admin: 01/15/18 09:19 Dose:  1 tab


Donepezil HCl (Aricept)  5 mg PO HS LASHANDA


   Stop: 03/11/18 20:59


   Last Admin: 01/14/18 20:29 Dose:  5 mg


Ferrous Sulfate (Iron)  325 mg PO BID LASHANDA


   Stop: 03/11/18 08:59


   Last Admin: 01/15/18 09:19 Dose:  325 mg


Lorazepam (Ativan)  0.5 mg PO Q4HR PRN; Protocol


   PRN Reason: Anxiety


   Stop: 02/09/18 00:00


   Last Admin: 01/12/18 01:57 Dose:  0.5 mg


Magnesium Hydroxide (Milk Of Magnesia)  30 ml PO HS PRN


   PRN Reason: Constipation


Multivitamins/Vitamin C (Theragran)  1 tab PO DAILY LASHANDA


   Stop: 03/11/18 08:59


   Last Admin: 01/15/18 09:19 Dose:  1 tab


Quetiapine Fumarate (Seroquel)  50 mg PO HS LASHANDA


   PRN Reason: Protocol


   Stop: 03/11/18 20:59


   Last Admin: 01/14/18 20:29 Dose:  50 mg


Zolpidem Tartrate (Ambien)  5 mg PO HS PRN


   PRN Reason: Insomnia


   Stop: 03/11/18 00:00


   Last Admin: 01/14/18 20:29 Dose:  5 mg











Assessment/Plan





- Problem List


Patient Problems: 


All Active Problems





Asthma (Acute) J45.909


Back pain (Acute) 


COPD (chronic obstructive pulmonary disease) (Acute) 


HTN (hypertension) (Acute) I10











- Plan


Plan: 


As per psych

## 2018-01-15 NOTE — PROGRESS NOTES
DATE:  01/13/2018



SUBJECTIVE:  The patient seen, chart reviewed, and discussed with staff.  The

patient continues to be depressed, endorsing passive suicidal ideations, feeling

of hopelessness and helplessness, continues to be confused and disoriented.  She

has, however, been compliant with her medications, following unit rules with

minimal redirections.



PLAN:  The patient continues to be very confused and impulsive.  It is felt that

she will require continued inpatient care for stabilization and treatment.  We

will monitor the patient on a daily basis for response to medications and

titrate meds as needed.





DD: 01/14/2018 16:52

DT: 01/15/2018 06:36

JOB# [id number]  [id number]

## 2018-01-15 NOTE — DISCHARGE SUMMARY
DATE OF DISCHARGE:  01/15/2018



JUSTIFICATION FOR HOSPITALIZATION:  The patient is suicidal.



CHIEF COMPLAINT:  "I am depressed."



HISTORY OF PRESENT ILLNESS:  An 82-year-old female, depressed, hopeless,

despairing off of the Seroquel, highly sensitive to Seroquel.  Seroquel was

discharged few weeks ago and she was decompensating there.



PAST PSYCHIATRIC HISTORY:  Apparently bipolar.



FAMILY HISTORY:  Noncontributory.



SOCIAL HISTORY:  Great involvement from the daughter, the patient staying at

[facility].



MEDICATIONS:  Noted.



MENTAL STATUS EXAMINATION:  Please see full psych eval for details.



PROVISIONAL DIAGNOSES:  Bipolar, depressed; the patient also with history of

dementia.



MEDICAL:  Please see full H and P.



HOSPITAL COURSE:  After initial assessment, the patient improved robustly, her

mood improved, affect improved, getting along well with staff and peers, staff

noting robust improvement, no longer suicidal, no longer endorsing intent and

plan, depression improved, better med compliance, less withdrawn, family

noticing improvement as well, staff noticing a lot of improvement as well,

sleeping well, eating well.



CONDITION UPON DISCHARGE:  Improved, good attention to ADLs, good eye contact. 

Speech within normal limits.  Mood "I feel much better."  Affect brighter and

broader.  Thought processes were engaged.  No SI, no intent, no plan.  No

evidence of any psychotic symptoms.  Insight and judgment improved.  The patient

hopeful, motivated, optimistic, denying any psychological distress or turmoil.



DISCHARGE DIAGNOSES:  Bipolar.  Also dementia, unspecified.



PROGNOSIS:  The patient follows up with outpatient mental health services and

states my compliant with the Seroquel.  Prognosis will improve, otherwise

guarded.  I did speak with daughter today over the phone.





DD: 01/15/2018 15:01

DT: 01/15/2018 20:34

JOB# [id number]  [id number]

## 2018-01-15 NOTE — PROGRESS NOTES
DATE:  



SUBJECTIVE:  The patient seen, chart reviewed, and discussed with staff.  The

patient continues to be very depressed, endorsing feeling of hopelessness,

helplessness as well as passive suicidal ideations.  She has, however, been

compliant with medications, following unit rules with minimal redirections.



PLAN:  The patient continues to be very depressed and suicidal.  It is felt that

she will require continued inpatient care for stabilization and treatment.  We

will monitor the patient on a daily basis for response to medications and

titrate medications as needed.





DD: 01/14/2018 16:53

DT: 01/15/2018 06:41

[facility]# [id number]  [id number]

## 2018-01-16 NOTE — PROGRESS NOTES
DATE:  01/15/2018



SUBJECTIVE:  The patient was seen in her room, lying in the bed.  The patient

appears to be confused, but no aggressive behaviors noted, appears to be guarded

at this time.  No behavioral issues noted.



OBJECTIVE:

VITAL SIGNS:  Temperature 98.3, heart rate 96, respirations 18, blood pressure

102/56, 94% on room air.

HEENT:  Head is atraumatic and normocephalic.  Eyes:  Bilateral conjunctivae are

clear.  Bilateral pupils are equally round and reactive.

NECK:  Supple.  No JVD.

CARDIOVASCULAR:  S1 and S2 without murmur.

PULMONARY:  Clear to auscultation.

GASTROINTESTINAL:  Soft and nontender without guarding.  Positive bowel sounds.

MUSCULOSKELETAL:  No clubbing, no cyanosis noted.



ASSESSMENT:

1.  Bipolar disorder.

2.  Dementia.

3.  Iron deficiency anemia.

4.  Osteoarthritis.

5.  Osteoporosis.



PLAN:  We will keep the patient inpatient in [facility].  We will

follow up with a psychiatrist to monitor the patient's condition and behavior

and also get a followup and monitor the patient's nutritional status.  Treatment

plans were discussed with the patient's nurse.  Treatment plans were discussed

with Dr. Reyes.





DD: 01/15/2018 14:58

DT: 01/16/2018 10:23

JOB# [id number]  [id number]

## 2019-04-05 ENCOUNTER — HOSPITAL ENCOUNTER (EMERGENCY)
Dept: HOSPITAL 4 - SED | Age: 84
Discharge: HOME | End: 2019-04-05
Payer: COMMERCIAL

## 2019-04-05 VITALS — WEIGHT: 161 LBS | HEIGHT: 64 IN | SYSTOLIC BLOOD PRESSURE: 162 MMHG | BODY MASS INDEX: 27.49 KG/M2

## 2019-04-05 VITALS — SYSTOLIC BLOOD PRESSURE: 149 MMHG

## 2019-04-05 DIAGNOSIS — K92.2: Primary | ICD-10-CM

## 2019-04-05 DIAGNOSIS — F32.9: ICD-10-CM

## 2019-04-05 DIAGNOSIS — Z79.899: ICD-10-CM

## 2019-04-05 DIAGNOSIS — J44.9: ICD-10-CM

## 2019-04-05 DIAGNOSIS — F41.9: ICD-10-CM

## 2019-04-05 DIAGNOSIS — I10: ICD-10-CM

## 2019-04-05 LAB
ALBUMIN SERPL BCP-MCNC: 3.4 G/DL (ref 3.4–4.8)
ALT SERPL W P-5'-P-CCNC: 20 U/L (ref 12–78)
ANION GAP SERPL CALCULATED.3IONS-SCNC: 6 MMOL/L (ref 5–15)
AST SERPL W P-5'-P-CCNC: 15 U/L (ref 10–37)
BASOPHILS # BLD AUTO: 0 K/UL (ref 0–0.2)
BASOPHILS NFR BLD AUTO: 0.4 % (ref 0–2)
BILIRUB SERPL-MCNC: 0.4 MG/DL (ref 0–1)
BUN SERPL-MCNC: 21 MG/DL (ref 8–21)
CALCIUM SERPL-MCNC: 9.3 MG/DL (ref 8.4–11)
CHLORIDE SERPL-SCNC: 104 MMOL/L (ref 98–107)
CREAT SERPL-MCNC: 0.71 MG/DL (ref 0.55–1.3)
EOSINOPHIL # BLD AUTO: 0.1 K/UL (ref 0–0.4)
EOSINOPHIL NFR BLD AUTO: 1.8 % (ref 0–4)
ERYTHROCYTE [DISTWIDTH] IN BLOOD BY AUTOMATED COUNT: 15.3 % (ref 9–15)
GFR SERPL CREATININE-BSD FRML MDRD: (no result) ML/MIN (ref 90–?)
GLUCOSE SERPL-MCNC: 88 MG/DL (ref 70–99)
HCT VFR BLD AUTO: 41.4 % (ref 36–48)
HGB BLD-MCNC: 13.7 G/DL (ref 12–16)
LIPASE SERPL-CCNC: 97 U/L (ref 73–393)
LYMPHOCYTES # BLD AUTO: 1.4 K/UL (ref 1–5.5)
LYMPHOCYTES NFR BLD AUTO: 22.4 % (ref 20.5–51.5)
MCH RBC QN AUTO: 30 PG (ref 27–31)
MCHC RBC AUTO-ENTMCNC: 33 % (ref 32–36)
MCV RBC AUTO: 90 FL (ref 79–98)
MONOCYTES # BLD MANUAL: 0.5 K/UL (ref 0–1)
MONOCYTES # BLD MANUAL: 8 % (ref 1.7–9.3)
NEUTROPHILS # BLD AUTO: 4.1 K/UL (ref 1.8–7.7)
NEUTROPHILS NFR BLD AUTO: 67.4 % (ref 40–70)
PLATELET # BLD AUTO: 192 K/UL (ref 130–430)
POTASSIUM SERPL-SCNC: 4.2 MMOL/L (ref 3.5–5.1)
RBC # BLD AUTO: 4.63 MIL/UL (ref 4.2–6.2)
SODIUM SERPLBLD-SCNC: 138 MMOL/L (ref 136–145)
WBC # BLD AUTO: 6.1 K/UL (ref 4.8–10.8)

## 2019-04-05 PROCEDURE — 82272 OCCULT BLD FECES 1-3 TESTS: CPT

## 2019-04-05 PROCEDURE — 74177 CT ABD & PELVIS W/CONTRAST: CPT

## 2019-04-05 PROCEDURE — 80053 COMPREHEN METABOLIC PANEL: CPT

## 2019-04-05 PROCEDURE — 83690 ASSAY OF LIPASE: CPT

## 2019-04-05 PROCEDURE — 85025 COMPLETE CBC W/AUTO DIFF WBC: CPT

## 2019-04-05 PROCEDURE — 99284 EMERGENCY DEPT VISIT MOD MDM: CPT

## 2019-04-05 PROCEDURE — 36415 COLL VENOUS BLD VENIPUNCTURE: CPT

## 2022-01-27 ENCOUNTER — HOSPITAL ENCOUNTER (INPATIENT)
Dept: HOSPITAL 4 - SED | Age: 87
LOS: 6 days | Discharge: HOSPICE | DRG: 189 | End: 2022-02-02
Payer: COMMERCIAL

## 2022-01-27 VITALS — SYSTOLIC BLOOD PRESSURE: 126 MMHG

## 2022-01-27 VITALS — HEIGHT: 64 IN | WEIGHT: 150 LBS | BODY MASS INDEX: 25.61 KG/M2

## 2022-01-27 DIAGNOSIS — Z85.3: ICD-10-CM

## 2022-01-27 DIAGNOSIS — R65.10: ICD-10-CM

## 2022-01-27 DIAGNOSIS — Z90.11: ICD-10-CM

## 2022-01-27 DIAGNOSIS — M81.0: ICD-10-CM

## 2022-01-27 DIAGNOSIS — E86.0: ICD-10-CM

## 2022-01-27 DIAGNOSIS — N39.0: ICD-10-CM

## 2022-01-27 DIAGNOSIS — E87.0: ICD-10-CM

## 2022-01-27 DIAGNOSIS — E83.41: ICD-10-CM

## 2022-01-27 DIAGNOSIS — J44.1: ICD-10-CM

## 2022-01-27 DIAGNOSIS — Z87.891: ICD-10-CM

## 2022-01-27 DIAGNOSIS — Z20.822: ICD-10-CM

## 2022-01-27 DIAGNOSIS — Z79.899: ICD-10-CM

## 2022-01-27 DIAGNOSIS — B95.5: ICD-10-CM

## 2022-01-27 DIAGNOSIS — R78.81: ICD-10-CM

## 2022-01-27 DIAGNOSIS — J96.22: Primary | ICD-10-CM

## 2022-01-27 LAB
BASOPHILS # BLD AUTO: 0 K/UL (ref 0–0.2)
BASOPHILS NFR BLD AUTO: 0.5 % (ref 0–2)
EOSINOPHIL # BLD AUTO: 0.2 K/UL (ref 0–0.4)
EOSINOPHIL NFR BLD AUTO: 2.6 % (ref 0–4)
ERYTHROCYTE [DISTWIDTH] IN BLOOD BY AUTOMATED COUNT: 15.7 % (ref 9–15)
HCT VFR BLD AUTO: 43.1 % (ref 36–48)
HGB BLD-MCNC: 13.5 G/DL (ref 12–16)
LYMPHOCYTES # BLD AUTO: 1 K/UL (ref 1–5.5)
LYMPHOCYTES NFR BLD AUTO: 15.9 % (ref 20.5–51.5)
MCH RBC QN AUTO: 27 PG (ref 27–31)
MCHC RBC AUTO-ENTMCNC: 31 % (ref 32–36)
MCV RBC AUTO: 86 FL (ref 79–98)
MONOCYTES # BLD MANUAL: 0.7 K/UL (ref 0–1)
MONOCYTES # BLD MANUAL: 10.3 % (ref 1.7–9.3)
NEUTROPHILS # BLD AUTO: 4.5 K/UL (ref 1.8–7.7)
NEUTROPHILS NFR BLD AUTO: 70.7 % (ref 40–70)
PLATELET # BLD AUTO: 239 K/UL (ref 130–430)
RBC # BLD AUTO: 5.01 MIL/UL (ref 4.2–6.2)
WBC # BLD AUTO: 6.3 K/UL (ref 4.8–10.8)

## 2022-01-27 PROCEDURE — G0482 DRUG TEST DEF 15-21 CLASSES: HCPCS

## 2022-01-27 PROCEDURE — G0378 HOSPITAL OBSERVATION PER HR: HCPCS

## 2022-01-27 PROCEDURE — U0003 INFECTIOUS AGENT DETECTION BY NUCLEIC ACID (DNA OR RNA); SEVERE ACUTE RESPIRATORY SYNDROME CORONAVIRUS 2 (SARS-COV-2) (CORONAVIRUS DISEASE [COVID-19]), AMPLIFIED PROBE TECHNIQUE, MAKING USE OF HIGH THROUGHPUT TECHNOLOGIES AS DESCRIBED BY CMS-2020-01-R: HCPCS

## 2022-01-27 NOTE — NUR
Placed in room 06  . Placed on cardiac monitor, blood pressure machine and 
pulse oximeter. To gown for exam. Side rails up.

## 2022-01-27 NOTE — NUR
[facility] Phototherapy Record  1. Tony Mcnair is a 61 year old male is here today for phototherapy (UVB) treatment for Plaque psoriasis.        Changes or new medications since last treatment:   NO    New medical conditions or problems since last treatment:   NO    Any problems with last phototherapy treatment?    NO    2. The patient tolerated phototherapy without complication    Patient will return for next UVB treatment, per protocol.     Patient to see provider every 4-12 weeks for follow-up during treatment.      All questions and concerns discussed with patient in clinic today.      Lorrie Graham   Pt. BIBA for 

c/o sob. Pt. has hx of COPD. Saturation 96% on 3lpm via NC. Pt. is A/O x3, Nikolai 
and forgetful.  Denies any current pain/discomfort.

## 2022-01-28 VITALS — SYSTOLIC BLOOD PRESSURE: 166 MMHG

## 2022-01-28 VITALS — SYSTOLIC BLOOD PRESSURE: 165 MMHG

## 2022-01-28 VITALS — SYSTOLIC BLOOD PRESSURE: 180 MMHG

## 2022-01-28 LAB
ALBUMIN SERPL BCP-MCNC: 3 G/DL (ref 3.4–4.8)
ALT SERPL W P-5'-P-CCNC: 13 U/L (ref 12–78)
AMORPH SED URNS QL MICRO: (no result) /HPF
AMPHETAMINES UR QL SCN: NEGATIVE
ANION GAP SERPL CALCULATED.3IONS-SCNC: 5 MMOL/L (ref 5–15)
APPEARANCE UR: (no result)
AST SERPL W P-5'-P-CCNC: 11 U/L (ref 10–37)
BACTERIA URNS QL MICRO: (no result) /HPF
BARBITURATES UR QL SCN: NEGATIVE
BENZODIAZ UR QL SCN: POSITIVE
BILIRUB SERPL-MCNC: 0.2 MG/DL (ref 0–1)
BILIRUB UR QL STRIP: (no result)
BUN SERPL-MCNC: 23 MG/DL (ref 8–21)
BZE UR QL SCN: NEGATIVE
CALCIUM SERPL-MCNC: 8.7 MG/DL (ref 8.4–11)
CANNABINOIDS UR QL SCN: NEGATIVE
CHLORIDE SERPL-SCNC: 104 MMOL/L (ref 98–107)
COLOR UR: YELLOW
CREAT SERPL-MCNC: 0.74 MG/DL (ref 0.55–1.3)
ETHANOL SERPL-MCNC: < 3 MG/DL (ref ?–10)
GFR SERPL CREATININE-BSD FRML MDRD: (no result) ML/MIN (ref 90–?)
GLUCOSE SERPL-MCNC: 103 MG/DL (ref 70–99)
GLUCOSE UR STRIP-MCNC: NEGATIVE MG/DL
HGB UR QL STRIP: (no result)
INR PPP: 1 (ref 0.8–1.2)
KETONES UR STRIP-MCNC: NEGATIVE MG/DL
LEUKOCYTE ESTERASE UR QL STRIP: (no result)
METHADONE UR-SCNC: NEGATIVE UMOL/L
METHAMPHET UR-SCNC: NEGATIVE UMOL/L
NITRITE UR QL STRIP: NEGATIVE
OPIATES UR QL SCN: NEGATIVE
OXYCODONE SERPL-MCNC: NEGATIVE NG/ML
PCP UR QL SCN: NEGATIVE
PH UR STRIP: 6 [PH] (ref 5–8)
POTASSIUM SERPL-SCNC: 3.5 MMOL/L (ref 3.5–5.1)
PROT UR QL STRIP: (no result)
PROTHROMBIN TIME: 10.6 SECS (ref 9.5–12.5)
RBC #/AREA URNS HPF: (no result) /HPF (ref 0–3)
SODIUM SERPLBLD-SCNC: 150 MMOL/L (ref 136–145)
SP GR UR STRIP: >=1.03 (ref 1–1.03)
TRICYCLICS UR-MCNC: NEGATIVE NG/ML
URINE PROPOXYPHENE SCREEN: NEGATIVE
UROBILINOGEN UR STRIP-MCNC: 0.2 MG/DL (ref 0.2–1)
WBC #/AREA URNS HPF: (no result) /HPF (ref 0–3)

## 2022-01-28 RX ADMIN — Medication SCH MG: at 21:22

## 2022-01-28 RX ADMIN — SODIUM CHLORIDE SCH MLS/HR: 4.5 INJECTION, SOLUTION INTRAVENOUS at 14:22

## 2022-01-28 RX ADMIN — SODIUM CHLORIDE SCH MLS/HR: 4.5 INJECTION, SOLUTION INTRAVENOUS at 22:39

## 2022-01-28 RX ADMIN — SODIUM CHLORIDE SCH MLS/HR: 4.5 INJECTION, SOLUTION INTRAVENOUS at 08:45

## 2022-01-28 RX ADMIN — DEXTROSE MONOHYDRATE SCH MLS/HR: 50 INJECTION, SOLUTION INTRAVENOUS at 21:15

## 2022-01-28 RX ADMIN — HEPARIN SODIUM SCH UNITS: 5000 INJECTION, SOLUTION INTRAVENOUS; SUBCUTANEOUS at 21:21

## 2022-01-28 RX ADMIN — Medication SCH MG: at 21:14

## 2022-01-28 NOTE — NUR
received from ED, alert , oriented, knows she is at the hospital,, but not the name.

On 3liter  NC, still sob noted, audible wheezes.  Sat on arrival 92-93%,  very [place], but at 
least able to answer some questions, the remaining info either from chart, or  SNF record.

BP trending up, attending paged x 1, for bp 180/107 with HR 74.

Lunch offered

## 2022-01-28 NOTE — NUR
PT'S DAUGHTER WOULD APPRECIATE IT VERY MUCH IF STAFF CAN KEEP HER UP-TO-DATE 
WITH HER MOM CONDITION WHILE IN THIS HOSPITAL DAUGHTER NAME IS ERLINDA CORNELIUS 
[phone]

## 2022-01-28 NOTE — NUR
very anxious elderly, fell asleep for one hour, woke up, asking to go home.  Reoriented her 
to the present hospital, still asking the same question.  Author had no choice but giving 
her 1mg ivp Ativan.  Incontinent, complete care

Lopressor 25mg po given for elevated bp.

will continue to monitor her bp

## 2022-01-28 NOTE — NUR
DENIES PAIN/DISCOMFORT.PT CONDITION REMAINS STABLE STILL UTILIZING 3L VIA NC 
REMAINS ALERT, ORIENTED TIMES 1-2 OVERALL APPEARANCES FAIRDENIES 
PAIN/DISCOMFORT

## 2022-01-28 NOTE — NUR
REC'D REPORT FROM OFFGOING NURSE PT IS AWAKE, ALERT BUT CONFUSED TO HER 
SURROUNDING IS WEARING 02 AT 3L VIA NC WITH HOB ELEVATED KELLIE WELL SATING 94% RR 
ELEVATED OVERALL APPEARANCES FAIR DENIES PAIN/DISCOMFORT SR ON THE MONITOR IS 
AFREBILE COMFORT AND SAFETY MAINTAINED TEACHING REINFORCED R/T HER SURROUNDING, 
PLAN OF CARE AND COMFORT AND SAFETY PT IS AWAITING FOR A BED IN TELE

## 2022-01-28 NOTE — NUR
Dr Martinez called and informed of lab Gram stain results - Gram pos. w/ cocci in chains. Orders 
for Infectious disease given to Charge RN by Dr Martinez. Will pass on in report at end of shift.

## 2022-01-28 NOTE — NUR
bp down to 166/60 with HR 65 after Lopressor 25mg po given.

Author took the liberty to [facility] the [facility], patient did get Pfizer x 2, NO BOOSTER, and also her FLU shot, not PNA

## 2022-01-29 VITALS — SYSTOLIC BLOOD PRESSURE: 159 MMHG

## 2022-01-29 VITALS — SYSTOLIC BLOOD PRESSURE: 139 MMHG

## 2022-01-29 VITALS — SYSTOLIC BLOOD PRESSURE: 122 MMHG

## 2022-01-29 VITALS — SYSTOLIC BLOOD PRESSURE: 90 MMHG

## 2022-01-29 VITALS — SYSTOLIC BLOOD PRESSURE: 128 MMHG

## 2022-01-29 LAB
ANION GAP SERPL CALCULATED.3IONS-SCNC: 3 MMOL/L (ref 5–15)
BASOPHILS # BLD AUTO: 0 K/UL (ref 0–0.2)
BASOPHILS NFR BLD AUTO: 0.5 % (ref 0–2)
BUN SERPL-MCNC: 21 MG/DL (ref 8–21)
CALCIUM SERPL-MCNC: 8.4 MG/DL (ref 8.4–11)
CHLORIDE SERPL-SCNC: 106 MMOL/L (ref 98–107)
CREAT SERPL-MCNC: 0.72 MG/DL (ref 0.55–1.3)
EOSINOPHIL # BLD AUTO: 0.1 K/UL (ref 0–0.4)
EOSINOPHIL NFR BLD AUTO: 2.3 % (ref 0–4)
ERYTHROCYTE [DISTWIDTH] IN BLOOD BY AUTOMATED COUNT: 15.8 % (ref 9–15)
GFR SERPL CREATININE-BSD FRML MDRD: (no result) ML/MIN (ref 90–?)
GLUCOSE SERPL-MCNC: 82 MG/DL (ref 70–99)
HCT VFR BLD AUTO: 40 % (ref 36–48)
HGB BLD-MCNC: 12.7 G/DL (ref 12–16)
LYMPHOCYTES # BLD AUTO: 1.6 K/UL (ref 1–5.5)
LYMPHOCYTES NFR BLD AUTO: 29.7 % (ref 20.5–51.5)
MCH RBC QN AUTO: 27 PG (ref 27–31)
MCHC RBC AUTO-ENTMCNC: 32 % (ref 32–36)
MCV RBC AUTO: 85 FL (ref 79–98)
MONOCYTES # BLD MANUAL: 0.6 K/UL (ref 0–1)
MONOCYTES # BLD MANUAL: 10 % (ref 1.7–9.3)
NEUTROPHILS # BLD AUTO: 3.2 K/UL (ref 1.8–7.7)
NEUTROPHILS NFR BLD AUTO: 57.5 % (ref 40–70)
PLATELET # BLD AUTO: 186 K/UL (ref 130–430)
POTASSIUM SERPL-SCNC: 3.6 MMOL/L (ref 3.5–5.1)
RBC # BLD AUTO: 4.7 MIL/UL (ref 4.2–6.2)
SODIUM SERPLBLD-SCNC: 146 MMOL/L (ref 136–145)
WBC # BLD AUTO: 5.5 K/UL (ref 4.8–10.8)

## 2022-01-29 RX ADMIN — Medication SCH MG: at 09:00

## 2022-01-29 RX ADMIN — Medication SCH MG: at 22:22

## 2022-01-29 RX ADMIN — DEXTROSE MONOHYDRATE SCH MLS/HR: 50 INJECTION, SOLUTION INTRAVENOUS at 22:22

## 2022-01-29 RX ADMIN — SODIUM CHLORIDE SCH MLS/HR: 4.5 INJECTION, SOLUTION INTRAVENOUS at 22:22

## 2022-01-29 RX ADMIN — MUPIROCIN SCH GM: 20 OINTMENT TOPICAL at 22:25

## 2022-01-29 RX ADMIN — HEPARIN SODIUM SCH UNITS: 5000 INJECTION, SOLUTION INTRAVENOUS; SUBCUTANEOUS at 22:25

## 2022-01-29 RX ADMIN — Medication SCH MG: at 22:23

## 2022-01-29 RX ADMIN — SODIUM CHLORIDE SCH MLS/HR: 4.5 INJECTION, SOLUTION INTRAVENOUS at 13:21

## 2022-01-29 RX ADMIN — Medication SCH MG: at 09:35

## 2022-01-29 RX ADMIN — HEPARIN SODIUM SCH UNITS: 5000 INJECTION, SOLUTION INTRAVENOUS; SUBCUTANEOUS at 09:58

## 2022-01-29 RX ADMIN — MUPIROCIN SCH GM: 20 OINTMENT TOPICAL at 15:23

## 2022-01-29 RX ADMIN — SODIUM CHLORIDE SCH MLS/HR: 4.5 INJECTION, SOLUTION INTRAVENOUS at 06:12

## 2022-01-29 NOTE — NUR
CLOSING NOTE

PT awake and sitting up in bed eating dinner. Oriented only to self and location. Encouraged 
PT to continue eating. IV site is clean, dry and intact. No signs of respiratory distress 
and PT denies pain. Nasal cannula in place on 3L. Safety checks made and call light left 
within reach.

## 2022-01-29 NOTE — NUR
pt now awake, seen pt eating break and fruit from lunch tray. pt asking if she can go home. 
told her that we still need to give her iv antibiotics.

## 2022-01-29 NOTE — NUR
Dietitian Recommendations



* Regular diet w/ Ensure Enlive BID

(ONS yields 700 kcal/day, 40 gm protein/day

* Encourage increase PO intakes

LP, RD



Please refer to Nutrition Assessment for details.

-------------------------------------------------------------------------------

Addendum: 01/29/22 at 1919 by Heaven Mao RD

-------------------------------------------------------------------------------

Amended: Links added.

## 2022-01-29 NOTE — NUR
SPOKE TO MD

Spoke to Dr Miles on the telephone and updated him on the PTs status. No new orders given.

## 2022-01-29 NOTE — NUR
OPENING NOTE

PT in bed sleeping, agitated and combative when disturbed. PT oriented only to self. IV site 
is clean, dry and intact. Nasal cannula in place on 3L, oxygen saturation 97%. No signs of 
respiratory distress or pain. Safety checks made, call light left within reach.

## 2022-01-29 NOTE — NUR
Nutrition Update



Bryn Scale 11 noted.

Pt admitted for COPD exacerbation.

Diet: regular

BMI: 25.7 kg/m2



RD to follow per nutrition care standards.

## 2022-01-29 NOTE — NUR
CONSULT:

CONSULT CALLED FOR DR. HARRIS

I SPOKE WITH ELLIE KUMAR

REASON FOR CONSULT: BLOOD CULTURE POSITIVE

REQUESTING CONSULT: DR. AVENDAÑO

CONSULTANT PHONE NUMBER: [phone]

## 2022-01-29 NOTE — NUR
Pt became restless, w/ SOB and Lungs w/ audible wheezing and crackles. resp gave ordered 
resp tx. Tylenol and zofran given to allay anxiety and to stop restless leg syndrome.

## 2022-01-30 VITALS — SYSTOLIC BLOOD PRESSURE: 164 MMHG

## 2022-01-30 VITALS — SYSTOLIC BLOOD PRESSURE: 152 MMHG

## 2022-01-30 VITALS — SYSTOLIC BLOOD PRESSURE: 110 MMHG

## 2022-01-30 VITALS — SYSTOLIC BLOOD PRESSURE: 136 MMHG

## 2022-01-30 VITALS — SYSTOLIC BLOOD PRESSURE: 155 MMHG

## 2022-01-30 LAB
AMYLASE SERPL-CCNC: 27 U/L (ref 0–100)
ANION GAP SERPL CALCULATED.3IONS-SCNC: 2 MMOL/L (ref 5–15)
BASOPHILS # BLD AUTO: 0 K/UL (ref 0–0.2)
BASOPHILS NFR BLD AUTO: 0.4 % (ref 0–2)
BUN SERPL-MCNC: 22 MG/DL (ref 8–21)
CALCIUM SERPL-MCNC: 8.3 MG/DL (ref 8.4–11)
CHLORIDE SERPL-SCNC: 105 MMOL/L (ref 98–107)
CREAT SERPL-MCNC: 0.74 MG/DL (ref 0.55–1.3)
EOSINOPHIL # BLD AUTO: 0.3 K/UL (ref 0–0.4)
EOSINOPHIL NFR BLD AUTO: 4.2 % (ref 0–4)
ERYTHROCYTE [DISTWIDTH] IN BLOOD BY AUTOMATED COUNT: 15.7 % (ref 9–15)
GFR SERPL CREATININE-BSD FRML MDRD: (no result) ML/MIN (ref 90–?)
GLUCOSE SERPL-MCNC: 84 MG/DL (ref 70–99)
HCT VFR BLD AUTO: 41.8 % (ref 36–48)
HGB BLD-MCNC: 13.3 G/DL (ref 12–16)
LIPASE SERPL-CCNC: 79 U/L (ref 73–393)
LYMPHOCYTES # BLD AUTO: 1.4 K/UL (ref 1–5.5)
LYMPHOCYTES NFR BLD AUTO: 20.3 % (ref 20.5–51.5)
MCH RBC QN AUTO: 27 PG (ref 27–31)
MCHC RBC AUTO-ENTMCNC: 32 % (ref 32–36)
MCV RBC AUTO: 85 FL (ref 79–98)
MONOCYTES # BLD MANUAL: 0.7 K/UL (ref 0–1)
MONOCYTES # BLD MANUAL: 9.6 % (ref 1.7–9.3)
NEUTROPHILS # BLD AUTO: 4.5 K/UL (ref 1.8–7.7)
NEUTROPHILS NFR BLD AUTO: 65.5 % (ref 40–70)
PLATELET # BLD AUTO: 198 K/UL (ref 130–430)
POTASSIUM SERPL-SCNC: 4.6 MMOL/L (ref 3.5–5.1)
RBC # BLD AUTO: 4.94 MIL/UL (ref 4.2–6.2)
SODIUM SERPLBLD-SCNC: 145 MMOL/L (ref 136–145)
WBC # BLD AUTO: 6.9 K/UL (ref 4.8–10.8)

## 2022-01-30 RX ADMIN — Medication SCH MG: at 10:08

## 2022-01-30 RX ADMIN — MUPIROCIN SCH GM: 20 OINTMENT TOPICAL at 10:09

## 2022-01-30 RX ADMIN — Medication SCH MG: at 20:56

## 2022-01-30 RX ADMIN — SODIUM CHLORIDE SCH MLS/HR: 9 INJECTION, SOLUTION INTRAVENOUS at 12:31

## 2022-01-30 RX ADMIN — DEXTROSE MONOHYDRATE SCH MLS/HR: 50 INJECTION, SOLUTION INTRAVENOUS at 20:55

## 2022-01-30 RX ADMIN — HEPARIN SODIUM SCH UNITS: 5000 INJECTION, SOLUTION INTRAVENOUS; SUBCUTANEOUS at 10:11

## 2022-01-30 RX ADMIN — ALBUTEROL SULFATE SCH MG: 2.5 SOLUTION RESPIRATORY (INHALATION) at 23:39

## 2022-01-30 RX ADMIN — MUPIROCIN SCH GM: 20 OINTMENT TOPICAL at 21:00

## 2022-01-30 RX ADMIN — ALBUTEROL SULFATE SCH MG: 2.5 SOLUTION RESPIRATORY (INHALATION) at 13:00

## 2022-01-30 RX ADMIN — Medication SCH MG: at 20:55

## 2022-01-30 RX ADMIN — ACETAMINOPHEN PRN MG: 325 TABLET ORAL at 14:20

## 2022-01-30 RX ADMIN — ALBUTEROL SULFATE SCH MG: 2.5 SOLUTION RESPIRATORY (INHALATION) at 22:48

## 2022-01-30 RX ADMIN — HEPARIN SODIUM SCH UNITS: 5000 INJECTION, SOLUTION INTRAVENOUS; SUBCUTANEOUS at 20:58

## 2022-01-30 RX ADMIN — IPRATROPIUM BROMIDE SCH MG: 0.5 SOLUTION RESPIRATORY (INHALATION) at 07:00

## 2022-01-30 RX ADMIN — IPRATROPIUM BROMIDE SCH MG: 0.5 SOLUTION RESPIRATORY (INHALATION) at 22:48

## 2022-01-30 RX ADMIN — SODIUM CHLORIDE SCH MLS/HR: 4.5 INJECTION, SOLUTION INTRAVENOUS at 03:39

## 2022-01-30 RX ADMIN — IPRATROPIUM BROMIDE SCH MG: 0.5 SOLUTION RESPIRATORY (INHALATION) at 11:00

## 2022-01-30 RX ADMIN — SODIUM CHLORIDE SCH MLS/HR: 4.5 INJECTION, SOLUTION INTRAVENOUS at 14:05

## 2022-01-30 NOTE — NUR
CALLED MD

PT complained of stomach pain. PT denied nausea, stated that it was a sharp pain in the 
middle of her stomach. MD provided new orders.

## 2022-01-30 NOTE — NUR
CLOSING NOTE

PT in bed, AxO x3. No signs of respiratory distress on 3L nasal cannula. PT denies pain at 
this time. Adjusted PT in bed for comfort and provided extra blankets. IV site is clean, 
dry, intact, and patent. Safety checks made and reviewed the use of the call light.

## 2022-01-31 VITALS — SYSTOLIC BLOOD PRESSURE: 159 MMHG

## 2022-01-31 VITALS — SYSTOLIC BLOOD PRESSURE: 142 MMHG

## 2022-01-31 VITALS — SYSTOLIC BLOOD PRESSURE: 138 MMHG

## 2022-01-31 VITALS — SYSTOLIC BLOOD PRESSURE: 158 MMHG

## 2022-01-31 LAB
ANION GAP SERPL CALCULATED.3IONS-SCNC: 4 MMOL/L (ref 5–15)
BASOPHILS # BLD AUTO: 0 K/UL (ref 0–0.2)
BASOPHILS NFR BLD AUTO: 0.4 % (ref 0–2)
BUN SERPL-MCNC: 13 MG/DL (ref 8–21)
CALCIUM SERPL-MCNC: 8.7 MG/DL (ref 8.4–11)
CHLORIDE SERPL-SCNC: 107 MMOL/L (ref 98–107)
CREAT SERPL-MCNC: 0.67 MG/DL (ref 0.55–1.3)
EOSINOPHIL # BLD AUTO: 0.3 K/UL (ref 0–0.4)
EOSINOPHIL NFR BLD AUTO: 5.1 % (ref 0–4)
ERYTHROCYTE [DISTWIDTH] IN BLOOD BY AUTOMATED COUNT: 15.9 % (ref 9–15)
GFR SERPL CREATININE-BSD FRML MDRD: (no result) ML/MIN (ref 90–?)
GLUCOSE SERPL-MCNC: 81 MG/DL (ref 70–99)
HCT VFR BLD AUTO: 41.8 % (ref 36–48)
HGB BLD-MCNC: 13.3 G/DL (ref 12–16)
LYMPHOCYTES # BLD AUTO: 1.5 K/UL (ref 1–5.5)
LYMPHOCYTES NFR BLD AUTO: 27.8 % (ref 20.5–51.5)
MCH RBC QN AUTO: 27 PG (ref 27–31)
MCHC RBC AUTO-ENTMCNC: 32 % (ref 32–36)
MCV RBC AUTO: 85 FL (ref 79–98)
MONOCYTES # BLD MANUAL: 0.6 K/UL (ref 0–1)
MONOCYTES # BLD MANUAL: 10.6 % (ref 1.7–9.3)
NEUTROPHILS # BLD AUTO: 3 K/UL (ref 1.8–7.7)
NEUTROPHILS NFR BLD AUTO: 56.1 % (ref 40–70)
PLATELET # BLD AUTO: 170 K/UL (ref 130–430)
POTASSIUM SERPL-SCNC: 4.1 MMOL/L (ref 3.5–5.1)
RBC # BLD AUTO: 4.93 MIL/UL (ref 4.2–6.2)
SODIUM SERPLBLD-SCNC: 146 MMOL/L (ref 136–145)
WBC # BLD AUTO: 5.3 K/UL (ref 4.8–10.8)

## 2022-01-31 RX ADMIN — Medication SCH MG: at 09:00

## 2022-01-31 RX ADMIN — SODIUM CHLORIDE SCH MLS/HR: 4.5 INJECTION, SOLUTION INTRAVENOUS at 01:29

## 2022-01-31 RX ADMIN — MUPIROCIN SCH APPLIC: 20 OINTMENT TOPICAL at 23:01

## 2022-01-31 RX ADMIN — SODIUM CHLORIDE SCH MLS/HR: 9 INJECTION, SOLUTION INTRAVENOUS at 12:46

## 2022-01-31 RX ADMIN — SODIUM CHLORIDE SCH MLS/HR: 4.5 INJECTION, SOLUTION INTRAVENOUS at 23:02

## 2022-01-31 RX ADMIN — Medication SCH MG: at 21:20

## 2022-01-31 RX ADMIN — ACETAMINOPHEN PRN MG: 325 TABLET ORAL at 12:46

## 2022-01-31 RX ADMIN — MUPIROCIN SCH APPLIC: 20 OINTMENT TOPICAL at 09:00

## 2022-01-31 RX ADMIN — SODIUM CHLORIDE SCH MLS/HR: 4.5 INJECTION, SOLUTION INTRAVENOUS at 08:58

## 2022-01-31 RX ADMIN — MUPIROCIN SCH GM: 20 OINTMENT TOPICAL at 12:00

## 2022-01-31 RX ADMIN — HEPARIN SODIUM SCH UNITS: 5000 INJECTION, SOLUTION INTRAVENOUS; SUBCUTANEOUS at 08:59

## 2022-01-31 RX ADMIN — SODIUM CHLORIDE SCH MLS/HR: 4.5 INJECTION, SOLUTION INTRAVENOUS at 15:24

## 2022-01-31 RX ADMIN — DEXTROSE MONOHYDRATE SCH MLS/HR: 50 INJECTION, SOLUTION INTRAVENOUS at 21:25

## 2022-01-31 RX ADMIN — HEPARIN SODIUM SCH UNITS: 5000 INJECTION, SOLUTION INTRAVENOUS; SUBCUTANEOUS at 21:34

## 2022-01-31 RX ADMIN — MUPIROCIN SCH GM: 20 OINTMENT TOPICAL at 23:01

## 2022-01-31 RX ADMIN — ZOLPIDEM TARTRATE PRN MG: 5 TABLET, FILM COATED ORAL at 22:58

## 2022-01-31 NOTE — NUR
PT IS AWAKE,ALERT W/ PERIODS OF CONFUSION. NOT IN ACUTE DISTRESS. PT IS SATURATING 96% @ 
3l/MIN VIA N/C. BREATHING TREATMENT ADMINISTERED AS ORDERED BY RT. DENIES PAIN. AFEBRILE. 
MRSA POSITIVE, BACTROBAN ADMINISTERED AS ORDERED. ON IVF 0.45% NS W/ LEFT HAND PIV 22 G 
INTACT & PATENT. PT IS INCONTINENT OF B & B, PROPER PERINEAL & INCONTINENCE CARE PROVIDED. 
OFFERED ORAL FLUIDS AND SNACKS AS TOLERATED. ENCOURAGED TO TURN & REPOSITION. KEPT CLEAN AND 
COMFORTABLE. FREQ VISUAL CHECKS & ROUNDING. NEEDS ANTICIPATED. WILL CON'T TO MONITOR.

## 2022-01-31 NOTE — NUR
Closing Note

Patient resting in bed, no pain or distress. Saturating 97% on 3 L O2 via N/C. PCR negative, 
Covid-19 precautions D/C per MD order. Contact precautions only for MRSA. Endorsed to night 
nurse.

## 2022-02-01 VITALS — SYSTOLIC BLOOD PRESSURE: 155 MMHG

## 2022-02-01 VITALS — SYSTOLIC BLOOD PRESSURE: 120 MMHG

## 2022-02-01 VITALS — SYSTOLIC BLOOD PRESSURE: 154 MMHG

## 2022-02-01 VITALS — SYSTOLIC BLOOD PRESSURE: 126 MMHG

## 2022-02-01 LAB
ANION GAP SERPL CALCULATED.3IONS-SCNC: 4 MMOL/L (ref 5–15)
BASOPHILS # BLD AUTO: 0 K/UL (ref 0–0.2)
BASOPHILS NFR BLD AUTO: 0.5 % (ref 0–2)
BUN SERPL-MCNC: 14 MG/DL (ref 8–21)
CALCIUM SERPL-MCNC: 8.6 MG/DL (ref 8.4–11)
CHLORIDE SERPL-SCNC: 108 MMOL/L (ref 98–107)
CREAT SERPL-MCNC: 0.68 MG/DL (ref 0.55–1.3)
EOSINOPHIL # BLD AUTO: 0.3 K/UL (ref 0–0.4)
EOSINOPHIL NFR BLD AUTO: 4.6 % (ref 0–4)
ERYTHROCYTE [DISTWIDTH] IN BLOOD BY AUTOMATED COUNT: 15.8 % (ref 9–15)
GFR SERPL CREATININE-BSD FRML MDRD: (no result) ML/MIN (ref 90–?)
GLUCOSE SERPL-MCNC: 82 MG/DL (ref 70–99)
HCT VFR BLD AUTO: 38 % (ref 36–48)
HGB BLD-MCNC: 12.2 G/DL (ref 12–16)
LYMPHOCYTES # BLD AUTO: 1.2 K/UL (ref 1–5.5)
LYMPHOCYTES NFR BLD AUTO: 22.4 % (ref 20.5–51.5)
MCH RBC QN AUTO: 27 PG (ref 27–31)
MCHC RBC AUTO-ENTMCNC: 32 % (ref 32–36)
MCV RBC AUTO: 85 FL (ref 79–98)
MONOCYTES # BLD MANUAL: 0.6 K/UL (ref 0–1)
MONOCYTES # BLD MANUAL: 11 % (ref 1.7–9.3)
NEUTROPHILS # BLD AUTO: 3.4 K/UL (ref 1.8–7.7)
NEUTROPHILS NFR BLD AUTO: 61.5 % (ref 40–70)
PLATELET # BLD AUTO: 154 K/UL (ref 130–430)
POTASSIUM SERPL-SCNC: 3.9 MMOL/L (ref 3.5–5.1)
RBC # BLD AUTO: 4.47 MIL/UL (ref 4.2–6.2)
SODIUM SERPLBLD-SCNC: 146 MMOL/L (ref 136–145)
WBC # BLD AUTO: 5.5 K/UL (ref 4.8–10.8)

## 2022-02-01 RX ADMIN — HEPARIN SODIUM SCH UNITS: 5000 INJECTION, SOLUTION INTRAVENOUS; SUBCUTANEOUS at 21:05

## 2022-02-01 RX ADMIN — MUPIROCIN SCH GM: 20 OINTMENT TOPICAL at 09:00

## 2022-02-01 RX ADMIN — DEXTROSE MONOHYDRATE SCH MLS/HR: 50 INJECTION, SOLUTION INTRAVENOUS at 21:03

## 2022-02-01 RX ADMIN — Medication SCH MG: at 08:54

## 2022-02-01 RX ADMIN — SODIUM CHLORIDE SCH MLS/HR: 0.9 INJECTION, SOLUTION INTRAVENOUS at 14:44

## 2022-02-01 RX ADMIN — IPRATROPIUM BROMIDE SCH MG: 0.5 SOLUTION RESPIRATORY (INHALATION) at 11:00

## 2022-02-01 RX ADMIN — Medication SCH MG: at 21:09

## 2022-02-01 RX ADMIN — BUDESONIDE SCH MG: 0.5 SUSPENSION RESPIRATORY (INHALATION) at 08:03

## 2022-02-01 RX ADMIN — ALBUTEROL SULFATE SCH MG: 2.5 SOLUTION RESPIRATORY (INHALATION) at 12:55

## 2022-02-01 RX ADMIN — SODIUM CHLORIDE SCH MLS/HR: 4.5 INJECTION, SOLUTION INTRAVENOUS at 14:45

## 2022-02-01 RX ADMIN — IPRATROPIUM BROMIDE SCH MG: 0.5 SOLUTION RESPIRATORY (INHALATION) at 22:46

## 2022-02-01 RX ADMIN — Medication SCH MG: at 09:11

## 2022-02-01 RX ADMIN — MUPIROCIN SCH GM: 20 OINTMENT TOPICAL at 21:08

## 2022-02-01 RX ADMIN — BUDESONIDE SCH MG: 0.5 SUSPENSION RESPIRATORY (INHALATION) at 22:46

## 2022-02-01 RX ADMIN — Medication SCH MG: at 21:05

## 2022-02-01 RX ADMIN — MUPIROCIN SCH APPLIC: 20 OINTMENT TOPICAL at 21:07

## 2022-02-01 RX ADMIN — ALBUTEROL SULFATE SCH MG: 2.5 SOLUTION RESPIRATORY (INHALATION) at 22:46

## 2022-02-01 RX ADMIN — HEPARIN SODIUM SCH UNITS: 5000 INJECTION, SOLUTION INTRAVENOUS; SUBCUTANEOUS at 08:55

## 2022-02-01 RX ADMIN — MUPIROCIN SCH APPLIC: 20 OINTMENT TOPICAL at 08:55

## 2022-02-01 RX ADMIN — IPRATROPIUM BROMIDE SCH MG: 0.5 SOLUTION RESPIRATORY (INHALATION) at 12:56

## 2022-02-01 RX ADMIN — ZOLPIDEM TARTRATE PRN MG: 5 TABLET, FILM COATED ORAL at 22:39

## 2022-02-01 RX ADMIN — SODIUM CHLORIDE SCH MLS/HR: 4.5 INJECTION, SOLUTION INTRAVENOUS at 21:02

## 2022-02-01 RX ADMIN — IPRATROPIUM BROMIDE SCH MG: 0.5 SOLUTION RESPIRATORY (INHALATION) at 08:04

## 2022-02-01 RX ADMIN — ALBUTEROL SULFATE SCH MG: 2.5 SOLUTION RESPIRATORY (INHALATION) at 08:03

## 2022-02-01 RX ADMIN — IPRATROPIUM BROMIDE SCH MG: 0.5 SOLUTION RESPIRATORY (INHALATION) at 08:03

## 2022-02-01 RX ADMIN — SODIUM CHLORIDE SCH MLS/HR: 4.5 INJECTION, SOLUTION INTRAVENOUS at 02:23

## 2022-02-01 NOTE — NUR
PT IS AWAKE,ALERT W/ PERIODS OF CONFUSION. NOT IN ACUTE DISTRESS. PT IS SATURATING 96% @ 
3l/MIN VIA N/C. BREATHING TREATMENT ADMINISTERED AS ORDERED BY RT. DENIES PAIN. AFEBRILE. 
MRSA POSITIVE & ON CONTACT ISOLATION OBSERVED. RECEIVED PT W/ INFILTRATION IV ON LEFT 
FOREARM. REMOVED IV ON LEFT FOREARM & INSERTED A NEW IV 22G ON R FOREARM,SECURED IN PLACE. 
PT IS INCONTINENT OF B & B, PROPER PERINEAL & INCONTINENCE CARE PROVIDED. OFFERED ORAL 
FLUIDS AND SNACKS AS TOLERATED. ENCOURAGED TO TURN & REPOSITION. KEPT CLEAN AND COMFORTABLE.

## 2022-02-01 NOTE — NUR
patient in bed, no s/s of distress, a/ox3.  has moments of increased confusion. iv intact 
patent, bed bound, bed in lowest locked position, call light within reach, will endorse 
continuation of care to night shift.

## 2022-02-01 NOTE — NUR
patient in bed, only complaint is she is cold and providing blankets resolved it. patient 
asks when she can go home, educated that she is receiving iv antibiotics.

## 2022-02-01 NOTE — NUR
patient in bed, no s/s of distress, a/ox3, confused on month but is able to tell the year.  
has moments of increased confusion. iv intact patent, bed bound, bed in lowest locked 
position, call light within reach, will continue to monitor.

## 2022-02-01 NOTE — NUR
educated patient to reposition q2h because patient is able to turn self. 

-------------------------------------------------------------------------------

Addendum: 02/01/22 at 1909 by Alma Michel RN

-------------------------------------------------------------------------------

not for 1300.

## 2022-02-02 VITALS — SYSTOLIC BLOOD PRESSURE: 125 MMHG

## 2022-02-02 VITALS — SYSTOLIC BLOOD PRESSURE: 113 MMHG

## 2022-02-02 VITALS — SYSTOLIC BLOOD PRESSURE: 142 MMHG

## 2022-02-02 VITALS — SYSTOLIC BLOOD PRESSURE: 115 MMHG

## 2022-02-02 LAB
ANION GAP SERPL CALCULATED.3IONS-SCNC: 3 MMOL/L (ref 5–15)
BASOPHILS # BLD AUTO: 0 K/UL (ref 0–0.2)
BASOPHILS NFR BLD AUTO: 0.5 % (ref 0–2)
BUN SERPL-MCNC: 15 MG/DL (ref 8–21)
CALCIUM SERPL-MCNC: 8.5 MG/DL (ref 8.4–11)
CHLORIDE SERPL-SCNC: 107 MMOL/L (ref 98–107)
CREAT SERPL-MCNC: 0.69 MG/DL (ref 0.55–1.3)
EOSINOPHIL # BLD AUTO: 0.4 K/UL (ref 0–0.4)
EOSINOPHIL NFR BLD AUTO: 6.5 % (ref 0–4)
ERYTHROCYTE [DISTWIDTH] IN BLOOD BY AUTOMATED COUNT: 16.2 % (ref 9–15)
GFR SERPL CREATININE-BSD FRML MDRD: (no result) ML/MIN (ref 90–?)
GLUCOSE SERPL-MCNC: 83 MG/DL (ref 70–99)
HCT VFR BLD AUTO: 36.7 % (ref 36–48)
HGB BLD-MCNC: 11.7 G/DL (ref 12–16)
LYMPHOCYTES # BLD AUTO: 1.3 K/UL (ref 1–5.5)
LYMPHOCYTES NFR BLD AUTO: 22.3 % (ref 20.5–51.5)
MCH RBC QN AUTO: 27 PG (ref 27–31)
MCHC RBC AUTO-ENTMCNC: 32 % (ref 32–36)
MCV RBC AUTO: 85 FL (ref 79–98)
MONOCYTES # BLD MANUAL: 0.6 K/UL (ref 0–1)
MONOCYTES # BLD MANUAL: 11 % (ref 1.7–9.3)
NEUTROPHILS # BLD AUTO: 3.5 K/UL (ref 1.8–7.7)
NEUTROPHILS NFR BLD AUTO: 59.7 % (ref 40–70)
PLATELET # BLD AUTO: 147 K/UL (ref 130–430)
POTASSIUM SERPL-SCNC: 3.9 MMOL/L (ref 3.5–5.1)
RBC # BLD AUTO: 4.31 MIL/UL (ref 4.2–6.2)
SODIUM SERPLBLD-SCNC: 143 MMOL/L (ref 136–145)
WBC # BLD AUTO: 5.8 K/UL (ref 4.8–10.8)

## 2022-02-02 RX ADMIN — Medication SCH MG: at 09:54

## 2022-02-02 RX ADMIN — MUPIROCIN SCH GM: 20 OINTMENT TOPICAL at 09:00

## 2022-02-02 RX ADMIN — SODIUM CHLORIDE SCH MLS/HR: 0.9 INJECTION, SOLUTION INTRAVENOUS at 13:19

## 2022-02-02 RX ADMIN — IPRATROPIUM BROMIDE SCH MG: 0.5 SOLUTION RESPIRATORY (INHALATION) at 02:02

## 2022-02-02 RX ADMIN — SODIUM CHLORIDE SCH MLS/HR: 4.5 INJECTION, SOLUTION INTRAVENOUS at 03:28

## 2022-02-02 RX ADMIN — SODIUM CHLORIDE SCH MLS/HR: 0.9 INJECTION, SOLUTION INTRAVENOUS at 01:55

## 2022-02-02 RX ADMIN — MUPIROCIN SCH APPLIC: 20 OINTMENT TOPICAL at 09:54

## 2022-02-02 RX ADMIN — HEPARIN SODIUM SCH UNITS: 5000 INJECTION, SOLUTION INTRAVENOUS; SUBCUTANEOUS at 09:53

## 2022-02-02 RX ADMIN — ALBUTEROL SULFATE SCH MG: 2.5 SOLUTION RESPIRATORY (INHALATION) at 02:03

## 2022-02-02 NOTE — NUR
Discharge Planning:

[phone] for home oxygen. DCP to follow up.

## 2022-02-02 NOTE — NUR
PATIENT DISCHARGED HOME TO [facility]. PICKED UP BY TRANSPORTER AND GAVE REPORT ON 
PATIENT. EDUCATED THAT PATIENT TESTED POSITIVE FOR MRSA IN THE NARES DURING HER ADMISSION. 
IV REMOVED, DISCHARGE PACKET PRINTED AND RECEIVED VERBAL CONSENT FROM DAUGHTER AND HAD EXTRA 
NURSE KEN LISTEN FOR CONSENT. PACKET IN CHART AND COPY SENT WITH PATIENT/TRANSPORT, GAVE 
REPORT TO GAGE THE NURSE ASSOCIATED WITH THE HOSPICE, COPY OF POLST PROVIDED TO [facility]. 
IV REMOVED, PATIENT DRESSED AND CLEAN FOR TRANSFER, WRIST BAND REMOVED, TOLERATED TRANSFER 
TO [facility] WELL. ON 3L NASAL CANULA FOR TRANSFER.